# Patient Record
Sex: MALE | Race: WHITE | NOT HISPANIC OR LATINO | URBAN - METROPOLITAN AREA
[De-identification: names, ages, dates, MRNs, and addresses within clinical notes are randomized per-mention and may not be internally consistent; named-entity substitution may affect disease eponyms.]

---

## 2023-04-12 ENCOUNTER — OFFICE VISIT (OUTPATIENT)
Dept: PRIMARY CARE | Facility: CLINIC | Age: 53
End: 2023-04-12
Payer: COMMERCIAL

## 2023-04-12 VITALS
HEART RATE: 87 BPM | WEIGHT: 262 LBS | HEIGHT: 68 IN | OXYGEN SATURATION: 93 % | DIASTOLIC BLOOD PRESSURE: 95 MMHG | SYSTOLIC BLOOD PRESSURE: 149 MMHG | BODY MASS INDEX: 39.71 KG/M2 | TEMPERATURE: 97.6 F

## 2023-04-12 DIAGNOSIS — E78.2 HYPERLIPEMIA, MIXED: ICD-10-CM

## 2023-04-12 DIAGNOSIS — E66.01 MORBID OBESITY WITH BMI OF 40.0-44.9, ADULT (MULTI): ICD-10-CM

## 2023-04-12 DIAGNOSIS — I10 BENIGN ESSENTIAL HYPERTENSION: ICD-10-CM

## 2023-04-12 DIAGNOSIS — E11.9 CONTROLLED TYPE 2 DIABETES MELLITUS WITHOUT COMPLICATION, WITHOUT LONG-TERM CURRENT USE OF INSULIN (MULTI): Primary | ICD-10-CM

## 2023-04-12 DIAGNOSIS — M10.00 IDIOPATHIC GOUT, UNSPECIFIED CHRONICITY, UNSPECIFIED SITE: ICD-10-CM

## 2023-04-12 DIAGNOSIS — F41.1 GENERALIZED ANXIETY DISORDER: ICD-10-CM

## 2023-04-12 DIAGNOSIS — E11.3293 MILD NONPROLIFERATIVE DIABETIC RETINOPATHY OF BOTH EYES WITHOUT MACULAR EDEMA ASSOCIATED WITH TYPE 2 DIABETES MELLITUS (MULTI): ICD-10-CM

## 2023-04-12 PROBLEM — H52.10 MYOPIA WITH PRESBYOPIA: Status: ACTIVE | Noted: 2023-04-12

## 2023-04-12 PROBLEM — M77.11 LATERAL EPICONDYLITIS OF RIGHT ELBOW: Status: ACTIVE | Noted: 2023-04-12

## 2023-04-12 PROBLEM — B35.4 TINEA CORPORIS: Status: ACTIVE | Noted: 2023-04-12

## 2023-04-12 PROBLEM — Q55.4 CONGENITAL ABSENCE OF VAS DEFERENS: Status: ACTIVE | Noted: 2023-04-12

## 2023-04-12 PROBLEM — H52.4 MYOPIA WITH PRESBYOPIA: Status: ACTIVE | Noted: 2023-04-12

## 2023-04-12 PROBLEM — H25.813 COMBINED FORM OF AGE-RELATED CATARACT, BOTH EYES: Status: ACTIVE | Noted: 2023-04-12

## 2023-04-12 PROBLEM — H52.209 ASTIGMATISM: Status: ACTIVE | Noted: 2023-04-12

## 2023-04-12 PROBLEM — M25.521 ARTHRALGIA OF RIGHT ELBOW: Status: ACTIVE | Noted: 2023-04-12

## 2023-04-12 PROBLEM — R42 LIGHTHEADED: Status: ACTIVE | Noted: 2023-04-12

## 2023-04-12 PROBLEM — S16.1XXA NECK STRAIN, INITIAL ENCOUNTER: Status: ACTIVE | Noted: 2023-04-12

## 2023-04-12 LAB — POC HEMOGLOBIN A1C: 6.8 % (ref 4.2–6.5)

## 2023-04-12 PROCEDURE — 90750 HZV VACC RECOMBINANT IM: CPT | Performed by: STUDENT IN AN ORGANIZED HEALTH CARE EDUCATION/TRAINING PROGRAM

## 2023-04-12 PROCEDURE — 1036F TOBACCO NON-USER: CPT | Performed by: STUDENT IN AN ORGANIZED HEALTH CARE EDUCATION/TRAINING PROGRAM

## 2023-04-12 PROCEDURE — 3008F BODY MASS INDEX DOCD: CPT | Performed by: STUDENT IN AN ORGANIZED HEALTH CARE EDUCATION/TRAINING PROGRAM

## 2023-04-12 PROCEDURE — 99396 PREV VISIT EST AGE 40-64: CPT | Performed by: STUDENT IN AN ORGANIZED HEALTH CARE EDUCATION/TRAINING PROGRAM

## 2023-04-12 PROCEDURE — 90471 IMMUNIZATION ADMIN: CPT | Performed by: STUDENT IN AN ORGANIZED HEALTH CARE EDUCATION/TRAINING PROGRAM

## 2023-04-12 PROCEDURE — 3077F SYST BP >= 140 MM HG: CPT | Performed by: STUDENT IN AN ORGANIZED HEALTH CARE EDUCATION/TRAINING PROGRAM

## 2023-04-12 PROCEDURE — 83036 HEMOGLOBIN GLYCOSYLATED A1C: CPT | Performed by: STUDENT IN AN ORGANIZED HEALTH CARE EDUCATION/TRAINING PROGRAM

## 2023-04-12 PROCEDURE — 4010F ACE/ARB THERAPY RXD/TAKEN: CPT | Performed by: STUDENT IN AN ORGANIZED HEALTH CARE EDUCATION/TRAINING PROGRAM

## 2023-04-12 PROCEDURE — 93000 ELECTROCARDIOGRAM COMPLETE: CPT | Performed by: STUDENT IN AN ORGANIZED HEALTH CARE EDUCATION/TRAINING PROGRAM

## 2023-04-12 PROCEDURE — 3080F DIAST BP >= 90 MM HG: CPT | Performed by: STUDENT IN AN ORGANIZED HEALTH CARE EDUCATION/TRAINING PROGRAM

## 2023-04-12 RX ORDER — OMEGA-3-ACID ETHYL ESTERS 1 G/1
2 CAPSULE, LIQUID FILLED ORAL 2 TIMES DAILY
COMMUNITY
Start: 2012-02-17 | End: 2023-04-12 | Stop reason: WASHOUT

## 2023-04-12 RX ORDER — ALLOPURINOL 100 MG/1
200 TABLET ORAL DAILY
Qty: 180 TABLET | Refills: 3 | Status: SHIPPED | OUTPATIENT
Start: 2023-04-12 | End: 2023-06-09 | Stop reason: SDUPTHER

## 2023-04-12 RX ORDER — ZOLPIDEM TARTRATE 10 MG/1
10 TABLET ORAL DAILY PRN
COMMUNITY
Start: 2013-06-24 | End: 2023-04-12 | Stop reason: WASHOUT

## 2023-04-12 RX ORDER — ESCITALOPRAM OXALATE 20 MG/1
20 TABLET ORAL DAILY
Qty: 90 TABLET | Refills: 3 | Status: SHIPPED | OUTPATIENT
Start: 2023-04-12 | End: 2023-07-25 | Stop reason: SDUPTHER

## 2023-04-12 RX ORDER — FENOFIBRATE 145 MG/1
1 TABLET, FILM COATED ORAL DAILY
COMMUNITY
Start: 2021-01-11 | End: 2023-04-12 | Stop reason: SDUPTHER

## 2023-04-12 RX ORDER — EMPAGLIFLOZIN 25 MG/1
1 TABLET, FILM COATED ORAL DAILY
COMMUNITY
Start: 2021-06-03 | End: 2023-07-25 | Stop reason: SDUPTHER

## 2023-04-12 RX ORDER — METFORMIN HYDROCHLORIDE 500 MG/1
TABLET, EXTENDED RELEASE ORAL
COMMUNITY
Start: 2020-11-28 | End: 2023-07-25 | Stop reason: SDUPTHER

## 2023-04-12 RX ORDER — LOSARTAN POTASSIUM 100 MG/1
1 TABLET ORAL DAILY
COMMUNITY
Start: 2021-03-08 | End: 2023-04-12 | Stop reason: SDUPTHER

## 2023-04-12 RX ORDER — ATORVASTATIN CALCIUM 40 MG/1
40 TABLET, FILM COATED ORAL DAILY
COMMUNITY
Start: 2023-01-26 | End: 2023-04-12 | Stop reason: SDUPTHER

## 2023-04-12 RX ORDER — GLIMEPIRIDE 2 MG/1
1 TABLET ORAL 2 TIMES DAILY
COMMUNITY
Start: 2016-09-14 | End: 2023-04-12 | Stop reason: WASHOUT

## 2023-04-12 RX ORDER — ESCITALOPRAM OXALATE 20 MG/1
1 TABLET ORAL DAILY
COMMUNITY
Start: 2021-01-11 | End: 2023-04-12 | Stop reason: SDUPTHER

## 2023-04-12 RX ORDER — LOSARTAN POTASSIUM 100 MG/1
100 TABLET ORAL DAILY
Qty: 90 TABLET | Refills: 3 | Status: SHIPPED | OUTPATIENT
Start: 2023-04-12 | End: 2023-07-25 | Stop reason: SDUPTHER

## 2023-04-12 RX ORDER — TRAMADOL HYDROCHLORIDE 50 MG/1
50 TABLET ORAL EVERY 6 HOURS PRN
COMMUNITY
Start: 2012-02-17 | End: 2023-04-12 | Stop reason: WASHOUT

## 2023-04-12 RX ORDER — MULTIVITAMIN
1 TABLET ORAL
COMMUNITY
Start: 2011-08-17 | End: 2023-04-12 | Stop reason: WASHOUT

## 2023-04-12 RX ORDER — ASPIRIN 81 MG/1
81 TABLET ORAL
COMMUNITY
Start: 2011-08-17

## 2023-04-12 RX ORDER — SIMVASTATIN 20 MG/1
20 TABLET, FILM COATED ORAL
COMMUNITY
Start: 2014-12-22 | End: 2023-04-12 | Stop reason: WASHOUT

## 2023-04-12 RX ORDER — ALLOPURINOL 100 MG/1
2 TABLET ORAL DAILY
COMMUNITY
Start: 2020-11-01 | End: 2023-04-12 | Stop reason: SDUPTHER

## 2023-04-12 RX ORDER — SIMVASTATIN 10 MG/1
1 TABLET, FILM COATED ORAL NIGHTLY
COMMUNITY
Start: 2016-09-14 | End: 2023-04-12 | Stop reason: WASHOUT

## 2023-04-12 RX ORDER — ATORVASTATIN CALCIUM 40 MG/1
40 TABLET, FILM COATED ORAL DAILY
Qty: 90 TABLET | Refills: 3 | Status: SHIPPED | OUTPATIENT
Start: 2023-04-12 | End: 2023-07-25 | Stop reason: SDUPTHER

## 2023-04-12 RX ORDER — FENOFIBRATE 145 MG/1
145 TABLET, FILM COATED ORAL DAILY
Qty: 90 TABLET | Refills: 3 | Status: SHIPPED | OUTPATIENT
Start: 2023-04-12 | End: 2023-07-25 | Stop reason: SDUPTHER

## 2023-04-12 NOTE — PROGRESS NOTES
"Subjective   Patient ID: Jai Betancourt is a 53 y.o. male who presents for Establish Care and Diabetes.    See excellent notes from Dr. Watt, who is now retired. Transferring to my care today; grateful for the referral.     Life/social: 2 daughters. Semi retired, sold plastics business; now consults.  (NARDA Jesus at [facility]). Nonsmoker. Low risk EtOH. No IVDU, medical MJ for knee pain. Diet is low carb when possible. Exercise is hit or miss; mostly walking a few days a week.    Re: DM2 - longstanding dx. A1c of 6.7. Patient denies symptomatic highs or lows with regards to their glucose. Reports no polyuria, polydipsia, fatigue, vision changes, and stocking/glove neuropathy. Reports fair foot hygiene, denies knowledge of any foot ulcers or trauma.     Re: HTN - elevated in office today, he attributes this to rushing in. Declines BP med change today, however has cardiology apt upcoming. Has occasional bouts of lightheadedness when he gets up too quickly.    Re: anxiety - controlled on SSRI. No HI/SI. Due for refill.     Re: obesity - BMI > 40. Diabetic. Willing to talk to pharmacy team for GLP-1/GIP.     Re: gout- stable. No flare ups. Needs refill on allopurinol.     Re: HM - colon screen current. PSA due. VZV shot #2 given today.    PMHx, FHx, Social Hx, Surg Hx personally reviewed at this appointment. No pertinent findings and/or changes from prior (if applicable).    ROS: Denies wt gain/loss f/c HA LoC CP SOB NVDC. See HPI above, and scanned sheet (if applicable). All other systems are reviewed and are without complaint.                  Review of Systems    Objective   BP (!) 149/95   Pulse 87   Temp 36.4 °C (97.6 °F)   Ht 1.727 m (5' 8\")   Wt 119 kg (262 lb)   SpO2 93%   BMI 39.84 kg/m²     Physical Exam  Gen: morbidly obese, NAD. AAO x3.  HEENT: NC/AT. Anicteric sclera, symmetric pupils. MMM no thrush.  Neck: Soft, supple. No LAD. No goiter.  CV: RRR nl s1s2 no m/r/g  Pulm: CTAB no w/r/r, good " air exchange  GI: obese, soft NTND BS+ no hsm  Ext: WWP no edema  Neuro: II-XII grossly intact, nonfocal systemic findings  MSK: 5/5 strength b/l UE and LE  Gait: unremarkable   Feet: no ulcers. Intact sensation.     Lab Results   Component Value Date    WBC 6.1 10/19/2022    HGB 15.7 10/19/2022    HCT 47.0 10/19/2022     10/19/2022    CHOL 122 10/17/2022    TRIG 298 (H) 10/17/2022    HDL 31.7 (A) 10/17/2022     10/17/2022    K 4.5 10/17/2022     10/17/2022    CREATININE 0.95 10/17/2022    BUN 21 10/17/2022    CO2 30 10/17/2022    HGBA1C 6.8 (A) 04/12/2023     Encounter Date: 04/12/23   ECG 12 lead (Clinic Performed)    Narrative    EKG normal sinus rhythm; please see scanned in EKG for result         Assessment/Plan   Problem List Items Addressed This Visit       Benign essential hypertension    Relevant Medications    losartan (Cozaar) 100 mg tablet    Other Relevant Orders    ECG 12 lead (Clinic Performed)    CBC and Auto Differential    Comprehensive Metabolic Panel    Lipid Panel    Prostate Specific Antigen, Screen    Controlled type 2 diabetes mellitus without complication, without long-term current use of insulin (CMS/[facility]) - Primary    Relevant Orders    Follow Up In [facility] - Pharmacy    POCT glycosylated hemoglobin (Hb A1C) manually resulted (Completed)    ECG 12 lead (Clinic Performed)    CBC and Auto Differential    Comprehensive Metabolic Panel    Lipid Panel    Prostate Specific Antigen, Screen    Albumin , Urine Random    Urinalysis Microscopic Only    Generalized anxiety disorder    Relevant Medications    escitalopram (Lexapro) 20 mg tablet    Hyperlipemia, mixed    Relevant Medications    atorvastatin (Lipitor) 40 mg tablet    fenofibrate (Tricor) 145 mg tablet    Other Relevant Orders    Lipid Panel    Idiopathic gout    Relevant Medications    allopurinol (Zyloprim) 100 mg tablet    Morbid obesity with BMI of 40.0-44.9, adult (CMS/[facility])    Relevant Orders    Follow  Up In [facility] - Pharmacy

## 2023-04-12 NOTE — PATIENT INSTRUCTIONS
Please stop at the lab ([address]) to complete your blood and/or urine work that I've ordered for you.    I will contact you with the results at my soonest convenience. I strongly urge you to use [facility] as this is the quickest and easiest way to access your results and recieve my correspondences.     I have referred you to our PharmD team to help find the right diabetic and weight loss medication for you. If covered, Mounjaro is my preferred medication.    I recommend that you lose weight via lifestyle modifications such as diet and exercise.    Shingles shot #2 given today.    Follow up with the cardiologist. Your EKG looks OK today.

## 2023-04-18 ENCOUNTER — LAB (OUTPATIENT)
Dept: LAB | Facility: LAB | Age: 53
End: 2023-04-18
Payer: COMMERCIAL

## 2023-04-18 DIAGNOSIS — E11.9 CONTROLLED TYPE 2 DIABETES MELLITUS WITHOUT COMPLICATION, WITHOUT LONG-TERM CURRENT USE OF INSULIN (MULTI): ICD-10-CM

## 2023-04-18 DIAGNOSIS — I10 BENIGN ESSENTIAL HYPERTENSION: ICD-10-CM

## 2023-04-18 DIAGNOSIS — E78.2 HYPERLIPEMIA, MIXED: ICD-10-CM

## 2023-04-18 LAB
ALANINE AMINOTRANSFERASE (SGPT) (U/L) IN SER/PLAS: 50 U/L (ref 10–52)
ALBUMIN (G/DL) IN SER/PLAS: 4.8 G/DL (ref 3.4–5)
ALBUMIN (MG/L) IN URINE: 15.6 MG/L
ALBUMIN/CREATININE (UG/MG) IN URINE: 21.8 UG/MG CRT (ref 0–30)
ALKALINE PHOSPHATASE (U/L) IN SER/PLAS: 56 U/L (ref 33–120)
ANION GAP IN SER/PLAS: 14 MMOL/L (ref 10–20)
ASPARTATE AMINOTRANSFERASE (SGOT) (U/L) IN SER/PLAS: 39 U/L (ref 9–39)
BASOPHILS (10*3/UL) IN BLOOD BY AUTOMATED COUNT: 0.02 X10E9/L (ref 0–0.1)
BASOPHILS/100 LEUKOCYTES IN BLOOD BY AUTOMATED COUNT: 0.3 % (ref 0–2)
BILIRUBIN TOTAL (MG/DL) IN SER/PLAS: 0.5 MG/DL (ref 0–1.2)
CALCIUM (MG/DL) IN SER/PLAS: 10.2 MG/DL (ref 8.6–10.6)
CARBON DIOXIDE, TOTAL (MMOL/L) IN SER/PLAS: 29 MMOL/L (ref 21–32)
CHLORIDE (MMOL/L) IN SER/PLAS: 102 MMOL/L (ref 98–107)
CHOLESTEROL (MG/DL) IN SER/PLAS: 120 MG/DL (ref 0–199)
CHOLESTEROL IN HDL (MG/DL) IN SER/PLAS: 34 MG/DL
CHOLESTEROL/HDL RATIO: 3.5
CREATININE (MG/DL) IN SER/PLAS: 1.01 MG/DL (ref 0.5–1.3)
CREATININE (MG/DL) IN URINE: 71.7 MG/DL (ref 20–370)
EOSINOPHILS (10*3/UL) IN BLOOD BY AUTOMATED COUNT: 0.06 X10E9/L (ref 0–0.7)
EOSINOPHILS/100 LEUKOCYTES IN BLOOD BY AUTOMATED COUNT: 0.9 % (ref 0–6)
ERYTHROCYTE DISTRIBUTION WIDTH (RATIO) BY AUTOMATED COUNT: 12.3 % (ref 11.5–14.5)
ERYTHROCYTE MEAN CORPUSCULAR HEMOGLOBIN CONCENTRATION (G/DL) BY AUTOMATED: 32.8 G/DL (ref 32–36)
ERYTHROCYTE MEAN CORPUSCULAR VOLUME (FL) BY AUTOMATED COUNT: 86 FL (ref 80–100)
ERYTHROCYTES (10*6/UL) IN BLOOD BY AUTOMATED COUNT: 6.21 X10E12/L (ref 4.5–5.9)
GFR MALE: 89 ML/MIN/1.73M2
GLUCOSE (MG/DL) IN SER/PLAS: 131 MG/DL (ref 74–99)
HEMATOCRIT (%) IN BLOOD BY AUTOMATED COUNT: 53.1 % (ref 41–52)
HEMOGLOBIN (G/DL) IN BLOOD: 17.4 G/DL (ref 13.5–17.5)
IMMATURE GRANULOCYTES/100 LEUKOCYTES IN BLOOD BY AUTOMATED COUNT: 0.5 % (ref 0–0.9)
LDL: 28 MG/DL (ref 0–99)
LEUKOCYTES (10*3/UL) IN BLOOD BY AUTOMATED COUNT: 6.4 X10E9/L (ref 4.4–11.3)
LYMPHOCYTES (10*3/UL) IN BLOOD BY AUTOMATED COUNT: 2.53 X10E9/L (ref 1.2–4.8)
LYMPHOCYTES/100 LEUKOCYTES IN BLOOD BY AUTOMATED COUNT: 39.8 % (ref 13–44)
MONOCYTES (10*3/UL) IN BLOOD BY AUTOMATED COUNT: 0.4 X10E9/L (ref 0.1–1)
MONOCYTES/100 LEUKOCYTES IN BLOOD BY AUTOMATED COUNT: 6.3 % (ref 2–10)
NEUTROPHILS (10*3/UL) IN BLOOD BY AUTOMATED COUNT: 3.32 X10E9/L (ref 1.2–7.7)
NEUTROPHILS/100 LEUKOCYTES IN BLOOD BY AUTOMATED COUNT: 52.2 % (ref 40–80)
NON HDL CHOLESTEROL: 86 MG/DL
NRBC (PER 100 WBCS) BY AUTOMATED COUNT: 0 /100 WBC (ref 0–0)
PLATELETS (10*3/UL) IN BLOOD AUTOMATED COUNT: 230 X10E9/L (ref 150–450)
POTASSIUM (MMOL/L) IN SER/PLAS: 4.5 MMOL/L (ref 3.5–5.3)
PROSTATE SPECIFIC ANTIGEN,SCREEN: 0.97 NG/ML (ref 0–4)
PROTEIN TOTAL: 8 G/DL (ref 6.4–8.2)
RBC, URINE: NORMAL /HPF (ref 0–5)
SODIUM (MMOL/L) IN SER/PLAS: 140 MMOL/L (ref 136–145)
SQUAMOUS EPITHELIAL CELLS, URINE: 1 /HPF
TRIGLYCERIDE (MG/DL) IN SER/PLAS: 290 MG/DL (ref 0–149)
UREA NITROGEN (MG/DL) IN SER/PLAS: 22 MG/DL (ref 6–23)
VLDL: 58 MG/DL (ref 0–40)
WBC, URINE: <1 /HPF (ref 0–5)

## 2023-04-18 PROCEDURE — 81001 URINALYSIS AUTO W/SCOPE: CPT

## 2023-04-18 PROCEDURE — 82570 ASSAY OF URINE CREATININE: CPT

## 2023-04-18 PROCEDURE — 84153 ASSAY OF PSA TOTAL: CPT

## 2023-04-18 PROCEDURE — 80053 COMPREHEN METABOLIC PANEL: CPT

## 2023-04-18 PROCEDURE — 82043 UR ALBUMIN QUANTITATIVE: CPT

## 2023-04-18 PROCEDURE — 36415 COLL VENOUS BLD VENIPUNCTURE: CPT

## 2023-04-18 PROCEDURE — 85025 COMPLETE CBC W/AUTO DIFF WBC: CPT

## 2023-04-18 PROCEDURE — 80061 LIPID PANEL: CPT

## 2023-05-04 DIAGNOSIS — E11.9 CONTROLLED TYPE 2 DIABETES MELLITUS WITHOUT COMPLICATION, WITHOUT LONG-TERM CURRENT USE OF INSULIN (MULTI): Primary | ICD-10-CM

## 2023-05-04 DIAGNOSIS — E66.01 MORBID OBESITY WITH BMI OF 40.0-44.9, ADULT (MULTI): ICD-10-CM

## 2023-05-04 NOTE — PROGRESS NOTES
Pt wishes to start Mounjaro or injectable GLP-1 covered by insurance for weight loss and diabetic control.

## 2023-05-05 ENCOUNTER — TELEPHONE (OUTPATIENT)
Dept: PHARMACY | Facility: HOSPITAL | Age: 53
End: 2023-05-05
Payer: COMMERCIAL

## 2023-05-10 ENCOUNTER — TELEMEDICINE (OUTPATIENT)
Dept: PHARMACY | Facility: HOSPITAL | Age: 53
End: 2023-05-10
Payer: COMMERCIAL

## 2023-05-10 DIAGNOSIS — E66.01 MORBID OBESITY WITH BMI OF 40.0-44.9, ADULT (MULTI): ICD-10-CM

## 2023-05-10 DIAGNOSIS — E11.9 CONTROLLED TYPE 2 DIABETES MELLITUS WITHOUT COMPLICATION, WITHOUT LONG-TERM CURRENT USE OF INSULIN (MULTI): ICD-10-CM

## 2023-05-10 RX ORDER — TIRZEPATIDE 2.5 MG/.5ML
2.5 INJECTION, SOLUTION SUBCUTANEOUS
Qty: 2 ML | Refills: 0 | Status: SHIPPED | OUTPATIENT
Start: 2023-05-10 | End: 2023-06-07 | Stop reason: ALTCHOICE

## 2023-05-10 NOTE — PATIENT INSTRUCTIONS
Mounjaro Education:     - Counseled patient on MOA, expectations, side effects, duration of therapy, contraindications, administration, and monitoring parameters  - Answered all patient questions and concerns  - Counseled patient on Mounjaro MOA, expectations, side effects, duration of therapy, administration, and monitoring parameters.  - Provided detailed dosing and administration counseling to ensure proper technique.   - Reviewed Mounjaro titration schedule, starting with 2.5 mg once weekly to a goal of 15 mg once weekly if tolerated  - Counseled patient on the benefits of GLP-1ra glycemic control and weight loss  - Reviewed storage requirements of Mounjaro when not in use, and when to administer the medication if a dose is missed.  - Advised patient that they may experience improved satiety after meals and portion sizes of meals may be reduced as doses of Mounjaro increase.

## 2023-05-10 NOTE — PROGRESS NOTES
Subjective   Patient ID: Jai Betancourt is a 53 y.o. male who presents for Diabetes (Referred for GLP/GIP initiation--Mounjaro; Will send Rx to pharmacy).    HPI     Review of Systems    Objective   There were no vitals taken for this visit.    Physical Exam    Assessment/Plan   Problem List Items Addressed This Visit          Endocrine/Metabolic    Controlled type 2 diabetes mellitus without complication, without long-term current use of insulin (CMS/[facility])    Morbid obesity with BMI of 40.0-44.9, adult (CMS/[facility])     Start Mounjaro 2.5 mg subcutaneous weekly; up-titrate monthly  Rx sent to [facility] to be mailed to pt; follow-up in 4 weeks    Continue all meds under the continuation of care with the referring provider and clinical pharmacy team.”

## 2023-06-07 ENCOUNTER — TELEMEDICINE (OUTPATIENT)
Dept: PHARMACY | Facility: HOSPITAL | Age: 53
End: 2023-06-07
Payer: COMMERCIAL

## 2023-06-07 DIAGNOSIS — E66.01 MORBID OBESITY WITH BMI OF 40.0-44.9, ADULT (MULTI): ICD-10-CM

## 2023-06-07 DIAGNOSIS — E11.9 CONTROLLED TYPE 2 DIABETES MELLITUS WITHOUT COMPLICATION, WITHOUT LONG-TERM CURRENT USE OF INSULIN (MULTI): ICD-10-CM

## 2023-06-07 RX ORDER — TIRZEPATIDE 5 MG/.5ML
5 INJECTION, SOLUTION SUBCUTANEOUS
Qty: 2 ML | Refills: 0 | Status: SHIPPED | OUTPATIENT
Start: 2023-06-07 | End: 2023-07-07 | Stop reason: ALTCHOICE

## 2023-06-07 NOTE — PROGRESS NOTES
Subjective   Patient ID: Jai Betancourt is a 53 y.o. male who presents for No chief complaint on file..    Nausea side effect; but minor in nature and is fine with this for the time being. Discussed monitoring diet to identify potential food intolerances and eating nutrient rich food since he is now eating significantly less. Patient requesting to move to next titration dose.     BG: has significantly improved and have been b/t 120-140 the last few days.         Assessment/Plan   Problem List Items Addressed This Visit          Endocrine/Metabolic    Controlled type 2 diabetes mellitus without complication, without long-term current use of insulin (CMS/[facility])    Morbid obesity with BMI of 40.0-44.9, adult (CMS/[facility])       LAB RESULTS:  Lab Results   Component Value Date    HGBA1C 6.8 (A) 04/12/2023    HGBA1C 7.7 (A) 10/20/2021     Lab Results   Component Value Date    CREATININE 1.01 04/18/2023      Lab Results   Component Value Date    CHOL 120 04/18/2023    CHOL 122 10/17/2022    CHOL 178 10/20/2021     Lab Results   Component Value Date    HDL 34.0 (A) 04/18/2023    HDL 31.7 (A) 10/17/2022    HDL 35.0 (A) 10/20/2021       No results found for: LDLCALC  Lab Results   Component Value Date    TRIG 290 (H) 04/18/2023    TRIG 298 (H) 10/17/2022    TRIG 636 (H) 10/20/2021     Uptitrate Mounjaro to 5.0 mg weekly  Follow-up in 4 weeks    Continue all meds under the continuation of care with the referring provider and clinical pharmacy team.

## 2023-06-09 ENCOUNTER — TELEMEDICINE (OUTPATIENT)
Dept: PHARMACY | Facility: HOSPITAL | Age: 53
End: 2023-06-09
Payer: COMMERCIAL

## 2023-06-09 DIAGNOSIS — E11.9 CONTROLLED TYPE 2 DIABETES MELLITUS WITHOUT COMPLICATION, WITHOUT LONG-TERM CURRENT USE OF INSULIN (MULTI): Primary | ICD-10-CM

## 2023-06-09 RX ORDER — ALLOPURINOL 100 MG/1
100 TABLET ORAL 2 TIMES DAILY
COMMUNITY
Start: 2014-03-05 | End: 2023-06-09 | Stop reason: ALTCHOICE

## 2023-06-09 ASSESSMENT — ENCOUNTER SYMPTOMS: DIABETIC ASSOCIATED SYMPTOMS: 0

## 2023-06-09 NOTE — PROGRESS NOTES
Jai Betancourt is a 53 y.o. male was referred to Clinical Pharmacy Team to complete a comprehensive medication review (CMR) with a pharmacist as part of the [employer] diabetes program.  The patient was referred for their Diabetes.    Referring Provider: Memo Watt MD    Subjective   No Known Allergies    [facility] - [place], [place] - [address]  [address]  [address]  [facility] [address]  Phone: [phone] Fax: [phone]    [facility] - [facility] AT Portal to Registered [facility]  [facility]  [place] [address]  Phone: [phone] Fax: [phone]      Diabetes  He has type 2 diabetes mellitus. The initial diagnosis of diabetes was made 14 years ago. His disease course has been fluctuating. There are no hypoglycemic associated symptoms. There are no diabetic associated symptoms. There are no hypoglycemic complications. There are no diabetic complications. Risk factors for coronary artery disease include diabetes mellitus. Current diabetic treatment includes oral agent (monotherapy). He is compliant with treatment all of the time. His weight is decreasing steadily. He is following a generally healthy diet. When asked about meal planning, he reported none. He has not had a previous visit with a dietitian. He participates in exercise three times a week. His breakfast blood glucose range is generally 110-130 mg/dl. His lunch blood glucose range is generally 110-130 mg/dl. His dinner blood glucose range is generally 110-130 mg/dl. His overall blood glucose range is 110-130 mg/dl. An ACE inhibitor/angiotensin II receptor blocker is being taken. He does not see a podiatrist.Eye exam is current.       Objective     There were no vitals taken for this visit.     LAB  Lab Results   Component Value Date    BILITOT 0.5 04/18/2023    CALCIUM 10.2 04/18/2023    CO2 29 04/18/2023      04/18/2023    CREATININE 1.01 04/18/2023    GLUCOSE 131 (H) 04/18/2023    ALKPHOS 56 04/18/2023    K 4.5 04/18/2023    PROT 8.0 04/18/2023     04/18/2023    AST 39 04/18/2023    ALT 50 04/18/2023    BUN 22 04/18/2023    ANIONGAP 14 04/18/2023    ALBUMIN 4.8 04/18/2023    GFRMALE 89 04/18/2023     Lab Results   Component Value Date    TRIG 290 (H) 04/18/2023    CHOL 120 04/18/2023    HDL 34.0 (A) 04/18/2023     Lab Results   Component Value Date    HGBA1C 6.8 (A) 04/12/2023       Current Outpatient Medications on File Prior to Visit   Medication Sig Dispense Refill    aspirin 81 mg EC tablet Take 1 tablet (81 mg) by mouth once daily.      atorvastatin (Lipitor) 40 mg tablet Take 1 tablet (40 mg) by mouth once daily. 90 tablet 3    escitalopram (Lexapro) 20 mg tablet Take 1 tablet (20 mg) by mouth once daily. 90 tablet 3    fenofibrate (Tricor) 145 mg tablet Take 1 tablet (145 mg) by mouth once daily. 90 tablet 3    Jardiance 25 mg Take 1 tablet (25 mg) by mouth once daily.      losartan (Cozaar) 100 mg tablet Take 1 tablet (100 mg) by mouth once daily. 90 tablet 3    metFORMIN XR (Glucophage-XR) 500 mg 24 hr tablet Take by mouth. take 2 tablets by mouth with one meal and 1 tablet with a second meal (3 tablets per day)      tirzepatide (Mounjaro) 5 mg/0.5 mL pen injector Inject 5 mg under the skin 1 (one) time per week. 2 mL 0    [DISCONTINUED] allopurinol (Zyloprim) 100 mg tablet Take 2 tablets (200 mg) by mouth once daily. 180 tablet 3    [DISCONTINUED] allopurinol (Zyloprim) 100 mg tablet Take 1 tablet (100 mg) by mouth twice a day.      [DISCONTINUED] tirzepatide (Mounjaro) 2.5 mg/0.5 mL pen injector Inject 2.5 mg under the skin 1 (one) time per week. 2 mL 0     No current facility-administered medications on file prior to visit.        HISTORICAL PHARMACOTHERAPY  See above    SECONDARY PREVENTION  - Statin? yes  - ACE-I/ARB? yes    Assessment/Plan   Problem List Items Addressed This Visit           Endocrine/Metabolic    Controlled type 2 diabetes mellitus without complication, without long-term current use of insulin (CMS/[facility]) - Primary          Follow up: 1 year    Continue all meds under the continuation of care with the referring provider and clinical pharmacy team.    Terry Sanchez PharmD     Verbal consent to manage patient's drug therapy was obtained from [the patient and/or an individual authorized to act on behalf of a patient]. They were informed they may decline to participate or withdraw from participation in pharmacy services at any time.

## 2023-07-07 ENCOUNTER — TELEMEDICINE (OUTPATIENT)
Dept: PHARMACY | Facility: HOSPITAL | Age: 53
End: 2023-07-07
Payer: COMMERCIAL

## 2023-07-07 DIAGNOSIS — E11.9 CONTROLLED TYPE 2 DIABETES MELLITUS WITHOUT COMPLICATION, WITHOUT LONG-TERM CURRENT USE OF INSULIN (MULTI): ICD-10-CM

## 2023-07-07 DIAGNOSIS — E66.01 MORBID OBESITY WITH BMI OF 40.0-44.9, ADULT (MULTI): ICD-10-CM

## 2023-07-07 RX ORDER — TIRZEPATIDE 7.5 MG/.5ML
7.5 INJECTION, SOLUTION SUBCUTANEOUS
Qty: 2 ML | Refills: 0 | Status: SHIPPED | OUTPATIENT
Start: 2023-07-07 | End: 2023-08-04 | Stop reason: ALTCHOICE

## 2023-07-07 NOTE — PROGRESS NOTES
"Subjective   Patient ID: Jai Betancourt is a 53 y.o. male who presents for No chief complaint on file.. Tolerated 5 mg weekly well; minor nausea after initial dose but subsides. BG b/t 120-150 per pt. Pt reports feeling great and ready to move up to next titration dose. Due for A1c; to see PCP before the end of the month.       Assessment/Plan   Problem List Items Addressed This Visit       Controlled type 2 diabetes mellitus without complication, without long-term current use of insulin (CMS/[facility])    Morbid obesity with BMI of 40.0-44.9, adult (CMS/[facility])       LAB RESULTS:  Lab Results   Component Value Date    HGBA1C 6.8 (A) 04/12/2023    HGBA1C 7.7 (A) 10/20/2021     Lab Results   Component Value Date    CREATININE 1.01 04/18/2023      Lab Results   Component Value Date    CHOL 120 04/18/2023    CHOL 122 10/17/2022    CHOL 178 10/20/2021     Lab Results   Component Value Date    HDL 34.0 (A) 04/18/2023    HDL 31.7 (A) 10/17/2022    HDL 35.0 (A) 10/20/2021       No results found for: \"LDLCALC\"  Lab Results   Component Value Date    TRIG 290 (H) 04/18/2023    TRIG 298 (H) 10/17/2022    TRIG 636 (H) 10/20/2021     Start Mounjaro 7.5 mg weekly; Continue Jardiance 25 mg daily  Review A1c results at next follow-up on 8/4/23 at 3:15p    Continue all meds under the continuation of care with the referring provider and clinical pharmacy team.         "

## 2023-07-12 ENCOUNTER — OFFICE VISIT (OUTPATIENT)
Dept: PRIMARY CARE | Facility: CLINIC | Age: 53
End: 2023-07-12
Payer: COMMERCIAL

## 2023-07-12 VITALS
HEART RATE: 75 BPM | TEMPERATURE: 97.5 F | BODY MASS INDEX: 38.65 KG/M2 | OXYGEN SATURATION: 97 % | WEIGHT: 255 LBS | HEIGHT: 68 IN | DIASTOLIC BLOOD PRESSURE: 88 MMHG | SYSTOLIC BLOOD PRESSURE: 142 MMHG

## 2023-07-12 DIAGNOSIS — E66.01 CLASS 2 SEVERE OBESITY DUE TO EXCESS CALORIES WITH SERIOUS COMORBIDITY AND BODY MASS INDEX (BMI) OF 38.0 TO 38.9 IN ADULT (MULTI): ICD-10-CM

## 2023-07-12 DIAGNOSIS — M10.00 IDIOPATHIC GOUT, UNSPECIFIED CHRONICITY, UNSPECIFIED SITE: ICD-10-CM

## 2023-07-12 DIAGNOSIS — I10 BENIGN ESSENTIAL HYPERTENSION: ICD-10-CM

## 2023-07-12 DIAGNOSIS — E11.9 CONTROLLED TYPE 2 DIABETES MELLITUS WITHOUT COMPLICATION, WITHOUT LONG-TERM CURRENT USE OF INSULIN (MULTI): Primary | ICD-10-CM

## 2023-07-12 LAB — POC HEMOGLOBIN A1C: 6.4 % (ref 4.2–6.5)

## 2023-07-12 PROCEDURE — 1036F TOBACCO NON-USER: CPT | Performed by: STUDENT IN AN ORGANIZED HEALTH CARE EDUCATION/TRAINING PROGRAM

## 2023-07-12 PROCEDURE — 3008F BODY MASS INDEX DOCD: CPT | Performed by: STUDENT IN AN ORGANIZED HEALTH CARE EDUCATION/TRAINING PROGRAM

## 2023-07-12 PROCEDURE — 3079F DIAST BP 80-89 MM HG: CPT | Performed by: STUDENT IN AN ORGANIZED HEALTH CARE EDUCATION/TRAINING PROGRAM

## 2023-07-12 PROCEDURE — 99213 OFFICE O/P EST LOW 20 MIN: CPT | Performed by: STUDENT IN AN ORGANIZED HEALTH CARE EDUCATION/TRAINING PROGRAM

## 2023-07-12 PROCEDURE — 4010F ACE/ARB THERAPY RXD/TAKEN: CPT | Performed by: STUDENT IN AN ORGANIZED HEALTH CARE EDUCATION/TRAINING PROGRAM

## 2023-07-12 PROCEDURE — 3077F SYST BP >= 140 MM HG: CPT | Performed by: STUDENT IN AN ORGANIZED HEALTH CARE EDUCATION/TRAINING PROGRAM

## 2023-07-12 PROCEDURE — 83036 HEMOGLOBIN GLYCOSYLATED A1C: CPT | Performed by: STUDENT IN AN ORGANIZED HEALTH CARE EDUCATION/TRAINING PROGRAM

## 2023-07-12 ASSESSMENT — ENCOUNTER SYMPTOMS
DIZZINESS: 0
POLYDIPSIA: 0
SWEATS: 1
WEAKNESS: 0
FATIGUE: 0
BLURRED VISION: 0
CONFUSION: 0
VISUAL CHANGE: 0
BLACKOUTS: 0
POLYPHAGIA: 0
HUNGER: 1
WEIGHT LOSS: 0
SPEECH DIFFICULTY: 0
TREMORS: 1
SEIZURES: 0
NERVOUS/ANXIOUS: 0
HEADACHES: 1

## 2023-07-12 NOTE — PROGRESS NOTES
Subjective   Patient ID: Jai Betancourt is a 53 y.o. male who presents for No chief complaint on file..    Life/social: 2 daughters. Semi retired, sold plastics business; now consults.  (Ann Marie, NARDA at [employer] [facility] [employer]). Nonsmoker. Low risk EtOH. No IVDU, medical MJ for knee pain. Diet is low carb when possible. Exercise is hit or miss; mostly walking a few days a week.     Re: DM2 - longstanding dx. A1c of 6.4% . Down ~10 lbs since starting mounjaro which he is tolerating very well. Patient denies symptomatic highs or lows with regards to their glucose. Reports no polyuria, polydipsia, fatigue, vision changes, and stocking/glove neuropathy. Reports fair foot hygiene, denies knowledge of any foot ulcers or trauma.      Re: HTN - see cardiology notes. BP acceptable. Reports no sx high or low from HTN; denies blurry vision, HA, dizziness LoC CP SoB Aranda and leg swelling     Re: anxiety - controlled on SSRI. No HI/SI.        Re: gout- stable. No flare ups. Needs refill on allopurinol.      Re: HM - colon screen current. PSA current.      PMHx, FHx, Social Hx, Surg Hx personally reviewed at this appointment. No pertinent findings and/or changes from prior (if applicable).     ROS: Denies wt gain/loss f/c HA LoC CP SOB NVDC. See HPI above, and scanned sheet (if applicable). All other systems are reviewed and are without complaint.       Diabetes  He has type 2 diabetes mellitus. No MedicAlert identification noted. The initial diagnosis of diabetes was made 14 years ago. Hypoglycemia symptoms include headaches, hunger, mood changes, sweats and tremors. Pertinent negatives for hypoglycemia include no confusion, dizziness, nervousness/anxiousness, pallor, seizures, sleepiness or speech difficulty. Pertinent negatives for diabetes include no blurred vision, no chest pain, no fatigue, no foot paresthesias, no foot ulcerations, no polydipsia, no polyphagia, no polyuria, no visual change, no weakness and no weight loss. Pertinent  negatives for hypoglycemia complications include no blackouts, no hospitalization, no nocturnal hypoglycemia, no required assistance and no required glucagon injection. Symptoms are improving. Pertinent negatives for diabetic complications include no CVA, heart disease, impotence, nephropathy, peripheral neuropathy, PVD or retinopathy. Risk factors for coronary artery disease include hypertension and obesity. Current diabetic treatment includes diet and oral agent (triple therapy). He is compliant with treatment all of the time. His weight is decreasing steadily. He is following a low fat/cholesterol diet. Meal planning includes avoidance of concentrated sweets and carbohydrate counting. He has not had a previous visit with a dietitian. He participates in exercise three times a week. He monitors blood glucose at home 3-4 x per week. Blood glucose monitoring compliance is adequate. His home blood glucose trend is decreasing steadily. His breakfast blood glucose is taken between 7-8 am. His breakfast blood glucose range is generally 130-140 mg/dl. His lunch blood glucose is taken between 12-1 pm. His lunch blood glucose range is generally 130-140 mg/dl. His overall blood glucose range is 140-180 mg/dl. He does not see a podiatrist.Eye exam is current.        Review of Systems   Constitutional:  Negative for fatigue and weight loss.   Eyes:  Negative for blurred vision.   Cardiovascular:  Negative for chest pain.   Endocrine: Negative for polydipsia, polyphagia and polyuria.   Genitourinary:  Negative for impotence.   Skin:  Negative for pallor.   Neurological:  Positive for tremors and headaches. Negative for dizziness, seizures, speech difficulty and weakness.   Psychiatric/Behavioral:  Negative for confusion. The patient is not nervous/anxious.        Objective   BP (!) 150/91   Pulse 75   Temp 36.4 °C (97.5 °F)   Wt 116 kg (255 lb)   SpO2 97%   BMI 38.77 kg/m²     Physical Exam  Gen: obese, NAD. AAO  x3.  HEENT: NC/AT. Anicteric sclera, symmetric pupils. MMM no thrush.  Neck: Soft, supple. No LAD. No goiter.  CV: RRR nl s1s2 no m/r/g  Pulm: CTAB no w/r/r, good air exchange  GI: soft NTND BS+ no hsm  Ext: WWP no edema  Neuro: II-XII grossly intact, nonfocal systemic findings  MSK: 5/5 strength b/l UE and LE  Gait: unremarkable   Feet: no ulcers, nl monofilament testing       Assessment/Plan   # DM2: 6.4%! Improving on mounjaro, down a few pounds  - continue current meds  - Routine Diabetic labs  - counselled on wt loss, diet, exercise, and lifestyle modifications  - yearly foot exams, eye exams     # HTN: at/near goal  - continue current regimen  - routine lab work if not recent  - continue lifestyle modifications    # Depression and/or Anxiety  - continue SSRI      # Gout: stable  - continue current meds    # Health Maintenance  - routine blood work  - Colon Cancer Screening: UTD  - PSA: UTD  - Immunizations: UTD  - AAA screening:  not indicated

## 2023-07-25 DIAGNOSIS — E11.9 CONTROLLED TYPE 2 DIABETES MELLITUS WITHOUT COMPLICATION, WITHOUT LONG-TERM CURRENT USE OF INSULIN (MULTI): ICD-10-CM

## 2023-07-25 DIAGNOSIS — E78.2 HYPERLIPEMIA, MIXED: ICD-10-CM

## 2023-07-25 DIAGNOSIS — Z00.00 HEALTHCARE MAINTENANCE: ICD-10-CM

## 2023-07-25 DIAGNOSIS — I10 BENIGN ESSENTIAL HYPERTENSION: ICD-10-CM

## 2023-07-25 DIAGNOSIS — F41.1 GENERALIZED ANXIETY DISORDER: ICD-10-CM

## 2023-07-25 RX ORDER — METFORMIN HYDROCHLORIDE 500 MG/1
500 TABLET, EXTENDED RELEASE ORAL
Qty: 90 TABLET | Refills: 2 | Status: SHIPPED | OUTPATIENT
Start: 2023-07-25 | End: 2023-07-28 | Stop reason: SDUPTHER

## 2023-07-25 RX ORDER — LOSARTAN POTASSIUM 100 MG/1
100 TABLET ORAL DAILY
Qty: 90 TABLET | Refills: 3 | Status: SHIPPED | OUTPATIENT
Start: 2023-07-25 | End: 2023-07-25

## 2023-07-25 RX ORDER — FENOFIBRATE 145 MG/1
145 TABLET, FILM COATED ORAL DAILY
Qty: 90 TABLET | Refills: 3 | Status: SHIPPED | OUTPATIENT
Start: 2023-07-25 | End: 2023-07-25

## 2023-07-25 RX ORDER — ESCITALOPRAM OXALATE 20 MG/1
20 TABLET ORAL DAILY
Qty: 90 TABLET | Refills: 3 | Status: SHIPPED | OUTPATIENT
Start: 2023-07-25 | End: 2023-07-25

## 2023-07-25 RX ORDER — ATORVASTATIN CALCIUM 40 MG/1
40 TABLET, FILM COATED ORAL DAILY
Qty: 90 TABLET | Refills: 3 | Status: SHIPPED | OUTPATIENT
Start: 2023-07-25 | End: 2023-07-25

## 2023-07-25 RX ORDER — EMPAGLIFLOZIN 25 MG/1
25 TABLET, FILM COATED ORAL DAILY
Qty: 90 TABLET | Refills: 2 | Status: SHIPPED | OUTPATIENT
Start: 2023-07-25 | End: 2023-07-25

## 2023-07-28 DIAGNOSIS — E11.9 CONTROLLED TYPE 2 DIABETES MELLITUS WITHOUT COMPLICATION, WITHOUT LONG-TERM CURRENT USE OF INSULIN (MULTI): ICD-10-CM

## 2023-07-29 RX ORDER — METFORMIN HYDROCHLORIDE 500 MG/1
500 TABLET, EXTENDED RELEASE ORAL
Qty: 270 TABLET | Refills: 2 | Status: SHIPPED | OUTPATIENT
Start: 2023-07-29 | End: 2023-07-29

## 2023-08-04 ENCOUNTER — TELEMEDICINE (OUTPATIENT)
Dept: PHARMACY | Facility: HOSPITAL | Age: 53
End: 2023-08-04
Payer: COMMERCIAL

## 2023-08-04 DIAGNOSIS — E11.9 CONTROLLED TYPE 2 DIABETES MELLITUS WITHOUT COMPLICATION, WITHOUT LONG-TERM CURRENT USE OF INSULIN (MULTI): ICD-10-CM

## 2023-08-04 DIAGNOSIS — E66.01 MORBID OBESITY WITH BMI OF 40.0-44.9, ADULT (MULTI): ICD-10-CM

## 2023-08-04 RX ORDER — TIRZEPATIDE 10 MG/.5ML
10 INJECTION, SOLUTION SUBCUTANEOUS
Qty: 2 ML | Refills: 0 | Status: SHIPPED | OUTPATIENT
Start: 2023-08-04 | End: 2023-08-31 | Stop reason: SINTOL

## 2023-08-04 NOTE — PROGRESS NOTES
"Subjective   Patient ID: Jai Betancourt is a 53 y.o. male who presents for Diabetes.  First dose of 7.5 mg he was \"feeling rough\" but recovered upon eating meals like he typically does. Subsequent injections were much better tolerated with 3 days of tolerable nausea. On the latter weekly injections, felt his appetite increasing significantly and is concerned that his blood sugars and weight may stall/increase. Desires to move up to 10 mg weekly. No other diabetic related complaints and overall is tolerating regimen well and patient has been happy with progress thus far. Recent A1c improved and at goal.       Assessment/Plan   Problem List Items Addressed This Visit       Controlled type 2 diabetes mellitus without complication, without long-term current use of insulin (CMS/[facility])    Relevant Medications    tirzepatide (Mounjaro) 10 mg/0.5 mL pen injector    Other Relevant Orders    Follow Up In [facility] - Pharmacy    Morbid obesity with BMI of 40.0-44.9, adult (CMS/[facility])    Relevant Medications    tirzepatide (Mounjaro) 10 mg/0.5 mL pen injector    Other Relevant Orders    Follow Up In [facility] - Pharmacy       LAB RESULTS:  Lab Results   Component Value Date    HGBA1C 6.4 07/12/2023    HGBA1C 6.8 (A) 04/12/2023    HGBA1C 7.7 (A) 10/20/2021     Lab Results   Component Value Date    CREATININE 1.01 04/18/2023      Lab Results   Component Value Date    CHOL 120 04/18/2023    CHOL 122 10/17/2022    CHOL 178 10/20/2021     Lab Results   Component Value Date    HDL 34.0 (A) 04/18/2023    HDL 31.7 (A) 10/17/2022    HDL 35.0 (A) 10/20/2021       No results found for: \"LDLCALC\"  Lab Results   Component Value Date    TRIG 290 (H) 04/18/2023    TRIG 298 (H) 10/17/2022    TRIG 636 (H) 10/20/2021     Continue all meds under the continuation of care with the referring provider and clinical pharmacy team.    Increase Mounjaro to 10 mg weekly  2.   Maintain spread out meals and snacks throughout the day and " stay well hydrated, especially the days proceeding injection.   3.    Follow-up in 4 weeks

## 2023-08-23 ENCOUNTER — TELEPHONE (OUTPATIENT)
Dept: PHARMACY | Facility: HOSPITAL | Age: 53
End: 2023-08-23
Payer: COMMERCIAL

## 2023-08-23 NOTE — TELEPHONE ENCOUNTER
Assessment/Plan:    Problem List Items Addressed This Visit     None      Visit Diagnoses     Right flank pain    -  Primary    Relevant Medications    ciprofloxacin (CIPRO) 500 mg tablet    Acute right-sided low back pain without sciatica        Relevant Orders    POCT urine dip (Completed)    Urine culture           Diagnoses and all orders for this visit:    Right flank pain  -     ciprofloxacin (CIPRO) 500 mg tablet; Take 1 tablet (500 mg total) by mouth every 12 (twelve) hours for 7 days    Acute right-sided low back pain without sciatica  -     POCT urine dip  -     Urine culture; Future  -     Urine culture    Other orders  -     Cancel: CT abdomen pelvis wo contrast; Future  -     Cancel: CBC and differential; Future  -     Cancel: Comprehensive metabolic panel; Future        Urine dip positive for leukocytes  Will also send out urine culture  Will treat empirically with ciprofloxacin  Clinically patient is stable  Advised her to let us know if symptoms do not improve or worsen  Subjective:      Patient ID: Akhil Wilson is a 32 y o  female  Brooke Singh is a 32year old female who presents with right flank pain for the past week  She states that over the weekend she had fevers on and off  TMax was 100 5 on Sunday  She has been using ibuprofen, which has been helping with the fevers  Her last dose of ibuprofen was Sunday  She also was experiencing some nausea  She denies any vomiting, abdominal pain, or diarrhea  She denies any decreased urination, dysuria, hematuria, frequency, or urgency  She does have a history of a kidney infection in the past        The following portions of the patient's history were reviewed and updated as appropriate:   She has no past medical history on file  ,  does not have any pertinent problems on file  ,   has a past surgical history that includes Appendectomy  ,  family history includes No Known Problems in her mother  ,   reports that she has never smoked   She has never used Called and left vm for patient to call this writer back in regards to n/v with Mounjaro. Relayed over vm that this writer has opening tomorrow to address his medication issue and recommended holding upcoming dose and push fluids and electrolytes.       smokeless tobacco  She reports that she drinks alcohol  Her drug history is not on file  ,  is allergic to lac bovis and pollen extract     Current Outpatient Medications   Medication Sig Dispense Refill    Norethin-Eth Estrad-Fe Biphas (LO LOESTRIN FE PO) Take by mouth      propranolol (INDERAL) 10 mg tablet Take 10 mg by mouth daily PRN 90 tablet 0    sertraline (ZOLOFT) 100 mg tablet Take 1 tablet (100 mg total) by mouth daily 90 tablet 0    ciprofloxacin (CIPRO) 500 mg tablet Take 1 tablet (500 mg total) by mouth every 12 (twelve) hours for 7 days 14 tablet 0     No current facility-administered medications for this visit  Review of Systems   Constitutional: Positive for fever (resolved)  Negative for chills, diaphoresis and fatigue  HENT: Negative for congestion, ear pain, postnasal drip, rhinorrhea, sneezing, sore throat and trouble swallowing  Eyes: Negative for pain and visual disturbance  Respiratory: Negative for apnea, cough, shortness of breath and wheezing  Cardiovascular: Negative for chest pain and palpitations  Gastrointestinal: Positive for nausea  Negative for abdominal pain, constipation, diarrhea and vomiting  Genitourinary: Positive for flank pain (right sided)  Negative for dysuria, frequency, hematuria and urgency  Musculoskeletal: Negative for arthralgias, gait problem and myalgias  Neurological: Negative for dizziness, syncope, weakness, light-headedness, numbness and headaches  Psychiatric/Behavioral: Negative for suicidal ideas  The patient is not nervous/anxious  Objective:  Vitals:    10/15/19 1538   BP: 112/70   BP Location: Left arm   Patient Position: Sitting   Temp: 97 8 °F (36 6 °C)   Weight: 79 8 kg (176 lb)   Height: 5' 4" (1 626 m)     Body mass index is 30 21 kg/m²  Physical Exam   Constitutional: She is oriented to person, place, and time  She appears well-developed and well-nourished  HENT:   Head: Normocephalic and atraumatic  Right Ear: Tympanic membrane, external ear and ear canal normal    Left Ear: Tympanic membrane, external ear and ear canal normal    Nose: Nose normal    Mouth/Throat: Oropharynx is clear and moist and mucous membranes are normal  No oropharyngeal exudate, posterior oropharyngeal edema or posterior oropharyngeal erythema  Eyes: Pupils are equal, round, and reactive to light  EOM are normal    Neck: Normal range of motion  Neck supple  Cardiovascular: Normal rate, regular rhythm and normal heart sounds  Exam reveals no gallop and no friction rub  No murmur heard  Pulmonary/Chest: Effort normal and breath sounds normal  No respiratory distress  She has no wheezes  She has no rales  Abdominal: Soft  Bowel sounds are normal  There is tenderness (Mild tenderness in right flank with palpation of lower abdomen  )  There is CVA tenderness (right)  There is no rebound and no guarding  Musculoskeletal: Normal range of motion  Lymphadenopathy:     She has no cervical adenopathy  Neurological: She is alert and oriented to person, place, and time  Skin: Skin is warm and dry  Psychiatric: She has a normal mood and affect  Her behavior is normal  Judgment and thought content normal    Vitals reviewed

## 2023-08-24 ENCOUNTER — TELEPHONE (OUTPATIENT)
Dept: PHARMACY | Facility: HOSPITAL | Age: 53
End: 2023-08-24
Payer: COMMERCIAL

## 2023-08-24 ENCOUNTER — TELEMEDICINE (OUTPATIENT)
Dept: PHARMACY | Facility: HOSPITAL | Age: 53
End: 2023-08-24
Payer: COMMERCIAL

## 2023-08-24 DIAGNOSIS — E11.9 CONTROLLED TYPE 2 DIABETES MELLITUS WITHOUT COMPLICATION, WITHOUT LONG-TERM CURRENT USE OF INSULIN (MULTI): Primary | ICD-10-CM

## 2023-08-24 DIAGNOSIS — E11.9 CONTROLLED TYPE 2 DIABETES MELLITUS WITHOUT COMPLICATION, WITHOUT LONG-TERM CURRENT USE OF INSULIN (MULTI): ICD-10-CM

## 2023-08-24 NOTE — TELEPHONE ENCOUNTER
Pt left vm that he is available after 9 am to call back in regards to Mounjaro adverse effects. Left VM for patient to call back

## 2023-08-24 NOTE — PATIENT INSTRUCTIONS
-Discontinue Mounjaro 10 mg   -Continue consuming fluid and electrolytes.   -potential for BG to increase and appetite to return; Check BG BID for the next week until follow-up on 8/31  -Patient to call this writer's direct line before follow-up if sx do not improve or worsen

## 2023-08-24 NOTE — PROGRESS NOTES
"Subjective   Patient ID: Jai Betancourt is a 53 y.o. male who presents for Follow-up. Patient has been experiencing severe nausea and vomiting with the Mounjaro 10 mg up to 3 days after injection. Is due for his injection today and stated he will no longer be taking. Is not open to down-titration at this time and \"done\" with Mounjaro.       Assessment/Plan   Problem List Items Addressed This Visit       Controlled type 2 diabetes mellitus without complication, without long-term current use of insulin (CMS/[facility])       LAB RESULTS:  Lab Results   Component Value Date    HGBA1C 6.4 07/12/2023    HGBA1C 6.8 (A) 04/12/2023    HGBA1C 7.7 (A) 10/20/2021     Lab Results   Component Value Date    CREATININE 1.01 04/18/2023      Lab Results   Component Value Date    CHOL 120 04/18/2023    CHOL 122 10/17/2022    CHOL 178 10/20/2021     Lab Results   Component Value Date    HDL 34.0 (A) 04/18/2023    HDL 31.7 (A) 10/17/2022    HDL 35.0 (A) 10/20/2021       No results found for: \"LDLCALC\"  Lab Results   Component Value Date    TRIG 290 (H) 04/18/2023    TRIG 298 (H) 10/17/2022    TRIG 636 (H) 10/20/2021         Continue all meds under the continuation of care with the referring provider and clinical pharmacy team.  -Discontinue Mounjaro 10 mg   -Continue consuming fluid and electrolytes.   -potential for BG to increase and appetite to return; Check BG BID for the next week until follow-up on 8/31. Next A1c due 10/23; baseline Previous A1c was 6.8 near the start of his Mounjaro--may not have a significant impact on goal.   -Patient to call this writer's direct line before follow-up if sx do not improve or worsen       "

## 2023-08-24 NOTE — Clinical Note
Pt decided he will no longer be taking Mounjaro; I follow-up with him in 1 week to see if sx improve and BG is still controlled. Thanks for contacting our team to let us know about this matter!

## 2023-08-31 ENCOUNTER — TELEMEDICINE (OUTPATIENT)
Dept: PHARMACY | Facility: HOSPITAL | Age: 53
End: 2023-08-31
Payer: COMMERCIAL

## 2023-08-31 DIAGNOSIS — E11.9 CONTROLLED TYPE 2 DIABETES MELLITUS WITHOUT COMPLICATION, WITHOUT LONG-TERM CURRENT USE OF INSULIN (MULTI): ICD-10-CM

## 2023-08-31 DIAGNOSIS — E66.01 MORBID OBESITY WITH BMI OF 40.0-44.9, ADULT (MULTI): ICD-10-CM

## 2023-08-31 NOTE — PROGRESS NOTES
"Subjective   Patient ID: Jai Betancourt is a 53 y.o. male who presents for Diabetes.Reports he just started getting appetite back over the past day or two and overall is feeling better since discontinuing his Mounjaro. Plans to keep his A1c/BG and weight stable with current regimen along with diet and exercise. Yesterday's B mg/dL and has been stable in this range. Does not report any hypo/hyperglycemia.     There were no vitals taken for this visit.        Assessment/Plan   Problem List Items Addressed This Visit       Controlled type 2 diabetes mellitus without complication, without long-term current use of insulin (CMS/[facility])    Morbid obesity with BMI of 40.0-44.9, adult (CMS/[facility])       LAB RESULTS:  Lab Results   Component Value Date    HGBA1C 6.4 2023    HGBA1C 6.8 (A) 2023    HGBA1C 7.7 (A) 10/20/2021     Lab Results   Component Value Date    CREATININE 1.01 2023      Lab Results   Component Value Date    CHOL 120 2023    CHOL 122 10/17/2022    CHOL 178 10/20/2021     Lab Results   Component Value Date    HDL 34.0 (A) 2023    HDL 31.7 (A) 10/17/2022    HDL 35.0 (A) 10/20/2021       No results found for: \"LDLCALC\"  Lab Results   Component Value Date    TRIG 290 (H) 2023    TRIG 298 (H) 10/17/2022    TRIG 636 (H) 10/20/2021     No components found for: \"CHOLHDL\"          No results found for: \"SCCMOPGW49\"    Continue all meds under the continuation of care with the referring provider and clinical pharmacy team.  Follow-up in 4 weeks; assess blood sugars/weight/sx       "

## 2023-09-28 ENCOUNTER — APPOINTMENT (OUTPATIENT)
Dept: PHARMACY | Facility: HOSPITAL | Age: 53
End: 2023-09-28
Payer: COMMERCIAL

## 2023-09-28 ENCOUNTER — TELEMEDICINE (OUTPATIENT)
Dept: PHARMACY | Facility: HOSPITAL | Age: 53
End: 2023-09-28
Payer: COMMERCIAL

## 2023-09-28 DIAGNOSIS — E11.9 CONTROLLED TYPE 2 DIABETES MELLITUS WITHOUT COMPLICATION, WITHOUT LONG-TERM CURRENT USE OF INSULIN (MULTI): ICD-10-CM

## 2023-09-28 DIAGNOSIS — E66.01 MORBID OBESITY WITH BMI OF 40.0-44.9, ADULT (MULTI): ICD-10-CM

## 2023-09-28 NOTE — Clinical Note
Pt doing well since discontinuing Mounjaro; had an 8lbs weight rebound and bg are b/t 130-150 since discontinuation. Plans to discuss regimen with you at follow-up at the end of the month. If additional therapy needed for DMII happy to help titrate/monitor.

## 2023-09-28 NOTE — PROGRESS NOTES
"Subjective   Patient ID: Jai Betancourt is a 53 y.o. male who presents for Follow-up (DMII). Doing much better since discontinueation of Mounjaro. States he has gained about 8 lbs back since stopping, but feels great overall. Says bg stays stable between 130-150 mg/dL. To see PCP on 10/30/23 for follow-up and will discuss his DMII regimen.       Assessment/Plan   Problem List Items Addressed This Visit       Controlled type 2 diabetes mellitus without complication, without long-term current use of insulin (CMS/[facility])    Morbid obesity with BMI of 40.0-44.9, adult (CMS/[facility])       LAB RESULTS:  Lab Results   Component Value Date    HGBA1C 6.4 07/12/2023    HGBA1C 6.8 (A) 04/12/2023    HGBA1C 7.7 (A) 10/20/2021     Lab Results   Component Value Date    CREATININE 1.01 04/18/2023      Lab Results   Component Value Date    CHOL 120 04/18/2023    CHOL 122 10/17/2022    CHOL 178 10/20/2021     Lab Results   Component Value Date    HDL 34.0 (A) 04/18/2023    HDL 31.7 (A) 10/17/2022    HDL 35.0 (A) 10/20/2021       No results found for: \"LDLCALC\"  Lab Results   Component Value Date    TRIG 290 (H) 04/18/2023    TRIG 298 (H) 10/17/2022    TRIG 636 (H) 10/20/2021     No components found for: \"CHOLHDL\"          No results found for: \"YIHAQHGV80\"    Continue all meds under the continuation of care with the referring provider and clinical pharmacy team.    Continue Metformin and Jardiance.   Follow-up as needed.      "

## 2023-10-11 ENCOUNTER — PHARMACY VISIT (OUTPATIENT)
Dept: PHARMACY | Facility: CLINIC | Age: 53
End: 2023-10-11
Payer: COMMERCIAL

## 2023-10-11 PROCEDURE — RXMED WILLOW AMBULATORY MEDICATION CHARGE

## 2023-10-18 ASSESSMENT — ENCOUNTER SYMPTOMS
HEADACHES: 0
HUNGER: 0
POLYDIPSIA: 0
WEAKNESS: 0
SPEECH DIFFICULTY: 0
BLACKOUTS: 0
BLURRED VISION: 0
WEIGHT LOSS: 0
FATIGUE: 0
SEIZURES: 0
CONFUSION: 0
DIZZINESS: 0
VISUAL CHANGE: 0
TREMORS: 0
SWEATS: 0
POLYPHAGIA: 0
NERVOUS/ANXIOUS: 0

## 2023-10-25 ENCOUNTER — OFFICE VISIT (OUTPATIENT)
Dept: PRIMARY CARE | Facility: CLINIC | Age: 53
End: 2023-10-25
Payer: COMMERCIAL

## 2023-10-25 DIAGNOSIS — E11.3293 MILD NONPROLIFERATIVE DIABETIC RETINOPATHY OF BOTH EYES WITHOUT MACULAR EDEMA ASSOCIATED WITH TYPE 2 DIABETES MELLITUS (MULTI): ICD-10-CM

## 2023-10-25 DIAGNOSIS — E11.9 CONTROLLED TYPE 2 DIABETES MELLITUS WITHOUT COMPLICATION, WITHOUT LONG-TERM CURRENT USE OF INSULIN (MULTI): Primary | ICD-10-CM

## 2023-10-25 LAB — POC HEMOGLOBIN A1C: 7.2 % (ref 4.2–6.5)

## 2023-10-25 PROCEDURE — 90471 IMMUNIZATION ADMIN: CPT | Performed by: STUDENT IN AN ORGANIZED HEALTH CARE EDUCATION/TRAINING PROGRAM

## 2023-10-25 PROCEDURE — 90472 IMMUNIZATION ADMIN EACH ADD: CPT | Performed by: STUDENT IN AN ORGANIZED HEALTH CARE EDUCATION/TRAINING PROGRAM

## 2023-10-25 PROCEDURE — 99213 OFFICE O/P EST LOW 20 MIN: CPT | Performed by: STUDENT IN AN ORGANIZED HEALTH CARE EDUCATION/TRAINING PROGRAM

## 2023-10-25 PROCEDURE — 4010F ACE/ARB THERAPY RXD/TAKEN: CPT | Performed by: STUDENT IN AN ORGANIZED HEALTH CARE EDUCATION/TRAINING PROGRAM

## 2023-10-25 PROCEDURE — 83036 HEMOGLOBIN GLYCOSYLATED A1C: CPT | Performed by: STUDENT IN AN ORGANIZED HEALTH CARE EDUCATION/TRAINING PROGRAM

## 2023-10-25 PROCEDURE — 3008F BODY MASS INDEX DOCD: CPT | Performed by: STUDENT IN AN ORGANIZED HEALTH CARE EDUCATION/TRAINING PROGRAM

## 2023-10-25 PROCEDURE — 90682 RIV4 VACC RECOMBINANT DNA IM: CPT | Performed by: STUDENT IN AN ORGANIZED HEALTH CARE EDUCATION/TRAINING PROGRAM

## 2023-10-25 PROCEDURE — 1036F TOBACCO NON-USER: CPT | Performed by: STUDENT IN AN ORGANIZED HEALTH CARE EDUCATION/TRAINING PROGRAM

## 2023-10-25 PROCEDURE — 90677 PCV20 VACCINE IM: CPT | Performed by: STUDENT IN AN ORGANIZED HEALTH CARE EDUCATION/TRAINING PROGRAM

## 2023-10-25 ASSESSMENT — ENCOUNTER SYMPTOMS
FATIGUE: 0
SEIZURES: 0
CONFUSION: 0
BLACKOUTS: 0
POLYDIPSIA: 0
WEIGHT LOSS: 0
POLYPHAGIA: 0
SPEECH DIFFICULTY: 0
DIZZINESS: 0
WEAKNESS: 0
BLURRED VISION: 0
HUNGER: 0
VISUAL CHANGE: 0
NERVOUS/ANXIOUS: 0
TREMORS: 0
HEADACHES: 0
SWEATS: 0

## 2023-10-25 NOTE — PATIENT INSTRUCTIONS
Your blood work is up to date; no need for additional draw at this appointment     I am referring you to our Pharmacist to help with your sugar control; the goal is to find safe, affordable medications and supplies to control your diabetes.     You received your flu shot today!     You received your pneumonia (PCV 20) shot today!

## 2023-10-25 NOTE — PROGRESS NOTES
Subjective   Patient ID: Jai Betancourt is a 53 y.o. male who presents for No chief complaint on file..    Re: DM2 - longstanding dx. A1c today of 7.2%. Was not able to tolerate the Mounjaro (nausea), has put on a few pounds since then. He wants to try doing the Mounjaro again but at lower dosing. Patient denies symptomatic highs or lows with regards to their glucose. Reports no polyuria, polydipsia, fatigue, vision changes, and stocking/glove neuropathy. Reports fair foot hygiene, denies knowledge of any foot ulcers or trauma.      Re: HTN - see cardiology notes. BP acceptable. Reports no sx high or low from HTN; denies blurry vision, HA, dizziness LoC CP SoB Aranda and leg swelling      Re: anxiety - controlled on SSRI. No HI/SI.      Re: gout- stable. No flare ups. Needs refill on allopurinol.      Re: HM - colon screen current. PSA current.      PMHx, FHx, Social Hx, Surg Hx personally reviewed at this appointment. No pertinent findings and/or changes from prior (if applicable).     ROS: Denies wt gain/loss f/c HA LoC CP SOB NVDC. See HPI above, and scanned sheet (if applicable). All other systems are reviewed and are without complaint.       Diabetes  He has type 2 diabetes mellitus. No MedicAlert identification noted. The initial diagnosis of diabetes was made 12 years ago. Pertinent negatives for hypoglycemia include no confusion, dizziness, headaches, hunger, mood changes, nervousness/anxiousness, pallor, seizures, sleepiness, speech difficulty, sweats or tremors. Pertinent negatives for diabetes include no blurred vision, no chest pain, no fatigue, no foot paresthesias, no foot ulcerations, no polydipsia, no polyphagia, no polyuria, no visual change, no weakness and no weight loss. Pertinent negatives for hypoglycemia complications include no blackouts, no hospitalization, no nocturnal hypoglycemia, no required assistance and no required glucagon injection. Symptoms are stable. Pertinent negatives for  diabetic complications include no CVA, heart disease, impotence, nephropathy, peripheral neuropathy, PVD or retinopathy. Risk factors for coronary artery disease include hypertension and obesity. Current diabetic treatment includes oral agent (dual therapy). He is compliant with treatment all of the time. His weight is stable. He is following a diabetic diet. Meal planning includes avoidance of concentrated sweets. He has not had a previous visit with a dietitian. He participates in exercise every other day. He monitors blood glucose at home 1-2 x per week. Blood glucose monitoring compliance is adequate. His home blood glucose trend is increasing steadily. His breakfast blood glucose is taken between 8-9 am. His breakfast blood glucose range is generally 140-180 mg/dl. His overall blood glucose range is 140-180 mg/dl. He does not see a podiatrist.Eye exam is current.        Review of Systems   Constitutional:  Negative for fatigue and weight loss.   Eyes:  Negative for blurred vision.   Cardiovascular:  Negative for chest pain.   Endocrine: Negative for polydipsia, polyphagia and polyuria.   Genitourinary:  Negative for impotence.   Skin:  Negative for pallor.   Neurological:  Negative for dizziness, tremors, seizures, speech difficulty, weakness and headaches.   Psychiatric/Behavioral:  Negative for confusion. The patient is not nervous/anxious.        Objective   There were no vitals taken for this visit.    Physical Exam  Gen: NAD. AAO x3.  HEENT: NC/AT. Anicteric sclera, symmetric pupils. MMM no thrush.  Neck: Soft, supple. No LAD. No goiter.  CV: RRR nl s1s2 no m/r/g  Pulm: CTAB no w/r/r, good air exchange  GI: soft NTND BS+ no hsm  Ext: WWP no edema  Neuro: II-XII grossly intact, nonfocal systemic findings  MSK: 5/5 strength b/l UE and LE  Gait: unremarkable   Feet: no ulcers, nl monofilament testing    Lab Results   Component Value Date    WBC 6.4 04/18/2023    HGB 17.4 04/18/2023    HCT 53.1 (H) 04/18/2023      04/18/2023    CHOL 120 04/18/2023    TRIG 290 (H) 04/18/2023    HDL 34.0 (A) 04/18/2023    ALT 50 04/18/2023    AST 39 04/18/2023     04/18/2023    K 4.5 04/18/2023     04/18/2023    CREATININE 1.01 04/18/2023    BUN 22 04/18/2023    CO2 29 04/18/2023    HGBA1C 6.4 07/12/2023     par       Assessment/Plan   # DM2: 7.2%, near goal  - continue current meds, touch base with [facility] about Mounjaro again  - Routine Diabetic labs  - counselled on wt loss, diet, exercise, and lifestyle modifications  - yearly foot exams, eye exams      # HTN: at/near goal  - continue current regimen  - routine lab work if not recent  - continue lifestyle modifications     # Depression and/or Anxiety  - continue SSRI       # Gout: stable  - continue current meds     # Health Maintenance  - routine blood work  - Colon Cancer Screening: UTD  - PSA: UTD  - Immunizations: flublok and PCV 20 (DM2 indication) today  - AAA screening:  not indicated

## 2023-10-27 ENCOUNTER — PHARMACY VISIT (OUTPATIENT)
Dept: PHARMACY | Facility: CLINIC | Age: 53
End: 2023-10-27
Payer: COMMERCIAL

## 2023-10-27 ENCOUNTER — TELEMEDICINE (OUTPATIENT)
Dept: PHARMACY | Facility: HOSPITAL | Age: 53
End: 2023-10-27
Payer: COMMERCIAL

## 2023-10-27 ENCOUNTER — APPOINTMENT (OUTPATIENT)
Dept: PHARMACY | Facility: HOSPITAL | Age: 53
End: 2023-10-27
Payer: COMMERCIAL

## 2023-10-27 DIAGNOSIS — E11.9 TYPE 2 DIABETES MELLITUS NOT AT GOAL (MULTI): Primary | ICD-10-CM

## 2023-10-27 DIAGNOSIS — E11.9 TYPE 2 DIABETES MELLITUS NOT AT GOAL (MULTI): ICD-10-CM

## 2023-10-27 PROCEDURE — RXMED WILLOW AMBULATORY MEDICATION CHARGE

## 2023-10-27 RX ORDER — TIRZEPATIDE 2.5 MG/.5ML
2.5 INJECTION, SOLUTION SUBCUTANEOUS
Qty: 2 ML | Refills: 2 | Status: SHIPPED | OUTPATIENT
Start: 2023-10-27 | End: 2023-12-19 | Stop reason: ALTCHOICE

## 2023-10-27 NOTE — PROGRESS NOTES
"Subjective   Patient ID: Jai Betancourt is a 53 y.o. male who presents for Follow-up. Pt and pcp would like to re-inititate Mounjaro at 2.5 mg weekly. Recent A1c increased by almost 1% since discontinuing.  At this time dose not want to go above 5 mg to avoid n/v like previous attempt.     Assessment/Plan   Problem List Items Addressed This Visit    None  Visit Diagnoses       Type 2 diabetes mellitus not at goal (CMS/[facility])                LAB RESULTS:  Lab Results   Component Value Date    HGBA1C 7.2 (A) 10/25/2023    HGBA1C 6.4 07/12/2023    HGBA1C 6.8 (A) 04/12/2023     Lab Results   Component Value Date    CREATININE 1.01 04/18/2023      Lab Results   Component Value Date    CHOL 120 04/18/2023    CHOL 122 10/17/2022    CHOL 178 10/20/2021     Lab Results   Component Value Date    HDL 34.0 (A) 04/18/2023    HDL 31.7 (A) 10/17/2022    HDL 35.0 (A) 10/20/2021       No results found for: \"LDLCALC\"  Lab Results   Component Value Date    TRIG 290 (H) 04/18/2023    TRIG 298 (H) 10/17/2022    TRIG 636 (H) 10/20/2021       Continue all meds under the continuation of care with the referring provider and clinical pharmacy team.    Start Mounjaro 2.5 mg weekly  Follow-up in 4 weeks to review BG and progress    "

## 2023-10-30 ENCOUNTER — APPOINTMENT (OUTPATIENT)
Dept: PRIMARY CARE | Facility: CLINIC | Age: 53
End: 2023-10-30
Payer: COMMERCIAL

## 2023-11-03 ENCOUNTER — PHARMACY VISIT (OUTPATIENT)
Dept: PHARMACY | Facility: CLINIC | Age: 53
End: 2023-11-03
Payer: COMMERCIAL

## 2023-11-03 DIAGNOSIS — D12.6 BENIGN NEOPLASM OF COLON, UNSPECIFIED PART OF COLON: Primary | ICD-10-CM

## 2023-11-03 PROCEDURE — RXMED WILLOW AMBULATORY MEDICATION CHARGE

## 2023-11-03 RX ORDER — POLYETHYLENE GLYCOL 3350, SODIUM SULFATE ANHYDROUS, SODIUM BICARBONATE, SODIUM CHLORIDE, POTASSIUM CHLORIDE 236; 22.74; 6.74; 5.86; 2.97 G/4L; G/4L; G/4L; G/4L; G/4L
4000 POWDER, FOR SOLUTION ORAL ONCE
Qty: 4000 ML | Refills: 0 | Status: SHIPPED | OUTPATIENT
Start: 2023-11-03 | End: 2023-11-04

## 2023-11-20 ENCOUNTER — TELEMEDICINE (OUTPATIENT)
Dept: PHARMACY | Facility: HOSPITAL | Age: 53
End: 2023-11-20
Payer: COMMERCIAL

## 2023-11-20 DIAGNOSIS — E11.9 TYPE 2 DIABETES MELLITUS NOT AT GOAL (MULTI): ICD-10-CM

## 2023-11-20 ASSESSMENT — ENCOUNTER SYMPTOMS: DIABETIC ASSOCIATED SYMPTOMS: 0

## 2023-11-20 NOTE — PROGRESS NOTES
"Subjective   Patient ID: Jai Betancourt is a 53 y.o. male who presents for No chief complaint on file..Tolerating well overall. Experiancing mild nausea for about 48 hours after injection and appetite has decreases. Blood sugars and weight trending down. Average BG between 120-150 and weight has dropped by about 3 lbs since starting 2.5 mg of Mounjaro. No changes to diet except portion sizes and no changes to exercise.     Diabetes  He presents for his follow-up diabetic visit. He has type 2 diabetes mellitus. His disease course has been improving. There are no hypoglycemic associated symptoms. There are no diabetic associated symptoms. There are no hypoglycemic complications. Symptoms are improving. His weight is increasing steadily. His overall blood glucose range is 140-180 mg/dl.       Assessment/Plan   Problem List Items Addressed This Visit    None  Visit Diagnoses       Type 2 diabetes mellitus not at goal (CMS/[facility])                LAB RESULTS:  Lab Results   Component Value Date    HGBA1C 7.2 (A) 10/25/2023    HGBA1C 6.4 07/12/2023    HGBA1C 6.8 (A) 04/12/2023     Lab Results   Component Value Date    CREATININE 1.01 04/18/2023      Lab Results   Component Value Date    CHOL 120 04/18/2023    CHOL 122 10/17/2022    CHOL 178 10/20/2021     Lab Results   Component Value Date    HDL 34.0 (A) 04/18/2023    HDL 31.7 (A) 10/17/2022    HDL 35.0 (A) 10/20/2021       No results found for: \"LDLCALC\"  Lab Results   Component Value Date    TRIG 290 (H) 04/18/2023    TRIG 298 (H) 10/17/2022    TRIG 636 (H) 10/20/2021         Continue all meds under the continuation of care with the referring provider and clinical pharmacy team.    Continue Mounjaro 2.5 mg weekly; refills available to be mailed to patient from [facility] - Patient to call to request refill.   2.   Follow-up in 1 month.     "

## 2023-11-21 ENCOUNTER — PHARMACY VISIT (OUTPATIENT)
Dept: PHARMACY | Facility: CLINIC | Age: 53
End: 2023-11-21
Payer: COMMERCIAL

## 2023-11-21 PROCEDURE — RXMED WILLOW AMBULATORY MEDICATION CHARGE

## 2023-12-18 ENCOUNTER — APPOINTMENT (OUTPATIENT)
Dept: PHARMACY | Facility: HOSPITAL | Age: 53
End: 2023-12-18
Payer: COMMERCIAL

## 2023-12-19 ENCOUNTER — TELEMEDICINE (OUTPATIENT)
Dept: PHARMACY | Facility: HOSPITAL | Age: 53
End: 2023-12-19
Payer: COMMERCIAL

## 2023-12-19 DIAGNOSIS — E11.9 TYPE 2 DIABETES MELLITUS NOT AT GOAL (MULTI): ICD-10-CM

## 2023-12-19 PROCEDURE — RXMED WILLOW AMBULATORY MEDICATION CHARGE

## 2023-12-19 RX ORDER — TIRZEPATIDE 5 MG/.5ML
5 INJECTION, SOLUTION SUBCUTANEOUS
Qty: 2 ML | Refills: 1 | Status: SHIPPED | OUTPATIENT
Start: 2023-12-19 | End: 2024-01-25 | Stop reason: WASHOUT

## 2023-12-19 NOTE — PROGRESS NOTES
"Subjective   Patient ID: Jai Betancourt is a 53 y.o. male who presents for No chief complaint on file.. Lost 30; put back on 12 since being off Mounjaro. Tolerating 2.5 mg weekly very well, but feels he could benefit from the next highest dose. No adverse events currently.  Has been keeping sugar \"in line\" with a more rigorous exericse and diet, but still has good days and bad days and a sugar range of 120-180. Weight currently neutral.       Assessment/Plan   Problem List Items Addressed This Visit    None      LAB RESULTS:  Lab Results   Component Value Date    HGBA1C 7.2 (A) 10/25/2023    HGBA1C 6.4 07/12/2023    HGBA1C 6.8 (A) 04/12/2023     Lab Results   Component Value Date    CREATININE 1.01 04/18/2023      Lab Results   Component Value Date    CHOL 120 04/18/2023    CHOL 122 10/17/2022    CHOL 178 10/20/2021     Lab Results   Component Value Date    HDL 34.0 (A) 04/18/2023    HDL 31.7 (A) 10/17/2022    HDL 35.0 (A) 10/20/2021       No results found for: \"LDLCALC\"  Lab Results   Component Value Date    TRIG 290 (H) 04/18/2023    TRIG 298 (H) 10/17/2022    TRIG 636 (H) 10/20/2021     No components found for: \"CHOLHDL\"          No results found for: \"[id number]\"    Continue all meds under the continuation of care with the referring provider and clinical pharmacy team.     Up-titrate Mounjaro to 5 mg weekly. Rx sent to [facility] to be mailed and will follow-up with patient in 1 month    "

## 2023-12-26 ENCOUNTER — PHARMACY VISIT (OUTPATIENT)
Dept: PHARMACY | Facility: CLINIC | Age: 53
End: 2023-12-26
Payer: COMMERCIAL

## 2024-01-03 PROCEDURE — RXMED WILLOW AMBULATORY MEDICATION CHARGE

## 2024-01-06 ENCOUNTER — PHARMACY VISIT (OUTPATIENT)
Dept: PHARMACY | Facility: CLINIC | Age: 54
End: 2024-01-06
Payer: COMMERCIAL

## 2024-01-12 ENCOUNTER — APPOINTMENT (OUTPATIENT)
Dept: GASTROENTEROLOGY | Facility: EXTERNAL LOCATION | Age: 54
End: 2024-01-12
Payer: COMMERCIAL

## 2024-01-16 ENCOUNTER — TELEMEDICINE (OUTPATIENT)
Dept: PHARMACY | Facility: HOSPITAL | Age: 54
End: 2024-01-16
Payer: COMMERCIAL

## 2024-01-16 DIAGNOSIS — E11.9 TYPE 2 DIABETES MELLITUS NOT AT GOAL (MULTI): ICD-10-CM

## 2024-01-16 NOTE — PROGRESS NOTES
"Subjective   Patient ID: Jai Betancourt is a 53 y.o. male who presents for Follow-up (DMII). Stopping mounjaro, patient already discussed with PCP. Is continuing to take Jardiance and Metformin. Has been increasing his exercise and eating better. Would like to continue to follow PCP for DMII and this writer involvement will be for if mounjaro started again in future.       Assessment/Plan   Problem List Items Addressed This Visit    None  Visit Diagnoses       Type 2 diabetes mellitus not at goal (CMS/[facility])                LAB RESULTS:  Lab Results   Component Value Date    HGBA1C 7.2 (A) 10/25/2023    HGBA1C 6.4 07/12/2023    HGBA1C 6.8 (A) 04/12/2023     Lab Results   Component Value Date    CREATININE 1.01 04/18/2023      Lab Results   Component Value Date    CHOL 120 04/18/2023    CHOL 122 10/17/2022    CHOL 178 10/20/2021     Lab Results   Component Value Date    HDL 34.0 (A) 04/18/2023    HDL 31.7 (A) 10/17/2022    HDL 35.0 (A) 10/20/2021       No results found for: \"LDLCALC\"  Lab Results   Component Value Date    TRIG 290 (H) 04/18/2023    TRIG 298 (H) 10/17/2022    TRIG 636 (H) 10/20/2021     No components found for: \"CHOLHDL\"          No results found for: \"[id number]\"    Continue all meds under the continuation of care with the referring provider and clinical pharmacy team.    Follow-up as needed.     "

## 2024-01-18 ASSESSMENT — ENCOUNTER SYMPTOMS
BLACKOUTS: 0
NERVOUS/ANXIOUS: 1

## 2024-01-25 ENCOUNTER — OFFICE VISIT (OUTPATIENT)
Dept: PRIMARY CARE | Facility: CLINIC | Age: 54
End: 2024-01-25
Payer: COMMERCIAL

## 2024-01-25 VITALS
TEMPERATURE: 97.3 F | BODY MASS INDEX: 38.8 KG/M2 | SYSTOLIC BLOOD PRESSURE: 133 MMHG | DIASTOLIC BLOOD PRESSURE: 86 MMHG | HEIGHT: 68 IN | HEART RATE: 74 BPM | WEIGHT: 256 LBS | OXYGEN SATURATION: 95 %

## 2024-01-25 DIAGNOSIS — M10.00 IDIOPATHIC GOUT, UNSPECIFIED CHRONICITY, UNSPECIFIED SITE: ICD-10-CM

## 2024-01-25 DIAGNOSIS — Z00.00 HEALTHCARE MAINTENANCE: ICD-10-CM

## 2024-01-25 DIAGNOSIS — I10 BENIGN ESSENTIAL HYPERTENSION: ICD-10-CM

## 2024-01-25 DIAGNOSIS — E11.3293 MILD NONPROLIFERATIVE DIABETIC RETINOPATHY OF BOTH EYES WITHOUT MACULAR EDEMA ASSOCIATED WITH TYPE 2 DIABETES MELLITUS (MULTI): ICD-10-CM

## 2024-01-25 DIAGNOSIS — F41.1 GENERALIZED ANXIETY DISORDER: ICD-10-CM

## 2024-01-25 DIAGNOSIS — E11.9 CONTROLLED TYPE 2 DIABETES MELLITUS WITHOUT COMPLICATION, WITHOUT LONG-TERM CURRENT USE OF INSULIN (MULTI): Primary | ICD-10-CM

## 2024-01-25 DIAGNOSIS — F33.1 MODERATE EPISODE OF RECURRENT MAJOR DEPRESSIVE DISORDER (MULTI): ICD-10-CM

## 2024-01-25 LAB — POC HEMOGLOBIN A1C: 6.8 % (ref 4.2–6.5)

## 2024-01-25 PROCEDURE — 83036 HEMOGLOBIN GLYCOSYLATED A1C: CPT | Performed by: STUDENT IN AN ORGANIZED HEALTH CARE EDUCATION/TRAINING PROGRAM

## 2024-01-25 PROCEDURE — 3008F BODY MASS INDEX DOCD: CPT | Performed by: STUDENT IN AN ORGANIZED HEALTH CARE EDUCATION/TRAINING PROGRAM

## 2024-01-25 PROCEDURE — 99214 OFFICE O/P EST MOD 30 MIN: CPT | Performed by: STUDENT IN AN ORGANIZED HEALTH CARE EDUCATION/TRAINING PROGRAM

## 2024-01-25 PROCEDURE — RXMED WILLOW AMBULATORY MEDICATION CHARGE

## 2024-01-25 PROCEDURE — 3075F SYST BP GE 130 - 139MM HG: CPT | Performed by: STUDENT IN AN ORGANIZED HEALTH CARE EDUCATION/TRAINING PROGRAM

## 2024-01-25 PROCEDURE — 4010F ACE/ARB THERAPY RXD/TAKEN: CPT | Performed by: STUDENT IN AN ORGANIZED HEALTH CARE EDUCATION/TRAINING PROGRAM

## 2024-01-25 PROCEDURE — 3079F DIAST BP 80-89 MM HG: CPT | Performed by: STUDENT IN AN ORGANIZED HEALTH CARE EDUCATION/TRAINING PROGRAM

## 2024-01-25 PROCEDURE — 1036F TOBACCO NON-USER: CPT | Performed by: STUDENT IN AN ORGANIZED HEALTH CARE EDUCATION/TRAINING PROGRAM

## 2024-01-25 RX ORDER — LOSARTAN POTASSIUM 50 MG/1
50 TABLET ORAL 2 TIMES DAILY
Qty: 180 TABLET | Refills: 3 | Status: SHIPPED | OUTPATIENT
Start: 2024-01-25 | End: 2025-01-24

## 2024-01-25 RX ORDER — BUPROPION HYDROCHLORIDE 150 MG/1
150 TABLET ORAL EVERY MORNING
Qty: 30 TABLET | Refills: 5 | Status: SHIPPED | OUTPATIENT
Start: 2024-01-25 | End: 2024-02-12 | Stop reason: SDUPTHER

## 2024-01-25 RX ORDER — ALLOPURINOL 100 MG/1
100 TABLET ORAL 2 TIMES DAILY
Qty: 180 TABLET | Refills: 3 | Status: SHIPPED | OUTPATIENT
Start: 2024-01-25 | End: 2025-01-24

## 2024-01-25 ASSESSMENT — ENCOUNTER SYMPTOMS
NERVOUS/ANXIOUS: 1
BLACKOUTS: 0

## 2024-01-25 NOTE — PATIENT INSTRUCTIONS
Your blood work is up to date; no need for additional draw at this appointment    I have added or changed your medications today. I also refilled your chronic medications. See the medication section of this packet for full details.      I recommend uptitration of the Wellbutrin 300mg in 2-4 weeks    Follow up with your therapist.

## 2024-01-25 NOTE — PROGRESS NOTES
Subjective   Patient ID: Jai Betancourt is a 53 y.o. male who presents for No chief complaint on file..    Re: Anxiety/Depression - mild/marked worsening due to external factors--his daughter is now back at school away from home and his parents' health is ailing. He is already on 20mg of Lexapro and in with a therapist. He is inquiring about switching to a different SSRI vs. Adding a booster such as Wellbutrin.     Re: DM2 - A1c improving. Unable to tolerate the Mounjaro. Patient denies symptomatic highs or lows with regards to their glucose. Reports no polyuria, polydipsia, fatigue, vision changes, and stocking/glove neuropathy. Reports fair foot hygiene, denies knowledge of any foot ulcers or trauma.     Re: Gout - needs refill on allopurinol.       Diabetes  He has type 2 diabetes mellitus. No MedicAlert identification noted. The initial diagnosis of diabetes was made 14 years ago. Hypoglycemia symptoms include nervousness/anxiousness. Pertinent negatives for hypoglycemia complications include no blackouts, no hospitalization, no nocturnal hypoglycemia, no required assistance and no required glucagon injection. Symptoms are improving. Pertinent negatives for diabetic complications include no CVA, heart disease, impotence, nephropathy, peripheral neuropathy, PVD or retinopathy. Risk factors for coronary artery disease include obesity. Current diabetic treatment includes diet and oral agent (dual therapy). He is compliant with treatment most of the time. His weight is stable. He is following a diabetic diet. Meal planning includes avoidance of concentrated sweets. He has not had a previous visit with a dietitian. He participates in exercise three times a week. He monitors blood glucose at home 3-4 x per week. He monitors urine at home <1 x per month. Blood glucose monitoring compliance is fair. His home blood glucose trend is decreasing steadily. His breakfast blood glucose is taken between 8-9 am. His breakfast  "blood glucose range is generally 110-130 mg/dl. His dinner blood glucose is taken after 8 pm. His dinner blood glucose range is generally 140-180 mg/dl. His overall blood glucose range is 140-180 mg/dl. He does not see a podiatrist.Eye exam is current.        Review of Systems   Genitourinary:  Negative for impotence.   Psychiatric/Behavioral:  The patient is nervous/anxious.        Objective   /86   Pulse 74   Temp 36.3 °C (97.3 °F)   Ht 1.727 m (5' 8\")   Wt 116 kg (256 lb)   SpO2 95%   BMI 38.92 kg/m²     Physical Exam  Gen: NAD. AAO x3.  HEENT: NC/AT. Anicteric sclera, symmetric pupils. MMM no thrush.  Neck: Soft, supple. No LAD. No goiter.  CV: RRR nl s1s2 no m/r/g  Pulm: CTAB no w/r/r, good air exchange  GI: soft NTND BS+ no hsm  Ext: WWP no edema  Neuro: II-XII grossly intact, nonfocal systemic findings  MSK: 5/5 strength b/l UE and LE  Gait: unremarkable   Feet: no ulcers, nl monofilament testing       Lab Results   Component Value Date    WBC 6.4 04/18/2023    HGB 17.4 04/18/2023    HCT 53.1 (H) 04/18/2023     04/18/2023    CHOL 120 04/18/2023    TRIG 290 (H) 04/18/2023    HDL 34.0 (A) 04/18/2023    ALT 50 04/18/2023    AST 39 04/18/2023     04/18/2023    K 4.5 04/18/2023     04/18/2023    CREATININE 1.01 04/18/2023    BUN 22 04/18/2023    CO2 29 04/18/2023    HGBA1C 6.8 (A) 01/25/2024     par         Assessment/Plan   # Depression and/or Anxiety  - continue Lexapro 20mg  - add Wellbutrin 150mg, uptitrate to 300mg in 2-4 weeks  - continue therapy    # DM2: Stable, A1c at/near goal  - continue current meds  - Routine Diabetic labs  - counselled on wt loss, diet, exercise, and lifestyle modifications  - yearly foot exams, eye exams    # Gout: chronic, stable  - continue current meds  - PRN colchicine or steroids during a flare up     # HTN: at/near goal  - continue current regimen  - routine lab work if not recent  - continue lifestyle modifications    # Health Maintenance  - " routine blood work  - Colon Cancer Screening: UTD  - PSA: UTD  - Immunizations: UTD  - AAA screening:  not indicated         Problem List Items Addressed This Visit             ICD-10-CM    Benign essential hypertension I10    Relevant Medications    losartan (Cozaar) 50 mg tablet    Controlled type 2 diabetes mellitus without complication, without long-term current use of insulin (CMS/HCC) - Primary E11.9    Generalized anxiety disorder F41.1    Relevant Medications    buPROPion XL (Wellbutrin XL) 150 mg 24 hr tablet    Idiopathic gout M10.00    Relevant Medications    allopurinol (Zyloprim) 100 mg tablet    Mild nonproliferative diabetic retinopathy of both eyes without macular edema (CMS/HCC) E11.3293     Other Visit Diagnoses         Codes    Healthcare maintenance     Z00.00    Relevant Orders    POCT glycosylated hemoglobin (Hb A1C) manually resulted (Completed)    Moderate episode of recurrent major depressive disorder (CMS/HCC)     F33.1    Relevant Medications    buPROPion XL (Wellbutrin XL) 150 mg 24 hr tablet

## 2024-01-26 ENCOUNTER — PHARMACY VISIT (OUTPATIENT)
Dept: PHARMACY | Facility: CLINIC | Age: 54
End: 2024-01-26
Payer: COMMERCIAL

## 2024-02-09 ENCOUNTER — LAB REQUISITION (OUTPATIENT)
Dept: LAB | Facility: HOSPITAL | Age: 54
End: 2024-02-09
Payer: COMMERCIAL

## 2024-02-09 ENCOUNTER — OFFICE VISIT (OUTPATIENT)
Dept: GASTROENTEROLOGY | Facility: EXTERNAL LOCATION | Age: 54
End: 2024-02-09
Payer: COMMERCIAL

## 2024-02-09 DIAGNOSIS — D12.5 BENIGN NEOPLASM OF SIGMOID COLON: ICD-10-CM

## 2024-02-09 DIAGNOSIS — Z86.010 PERSONAL HISTORY OF COLONIC POLYPS: ICD-10-CM

## 2024-02-09 DIAGNOSIS — I10 ESSENTIAL HYPERTENSION, BENIGN: ICD-10-CM

## 2024-02-09 DIAGNOSIS — Z12.11 SCREEN FOR COLON CANCER: Primary | ICD-10-CM

## 2024-02-09 DIAGNOSIS — D12.4 BENIGN NEOPLASM OF DESCENDING COLON: ICD-10-CM

## 2024-02-09 DIAGNOSIS — D12.6 BENIGN NEOPLASM OF COLON, UNSPECIFIED: ICD-10-CM

## 2024-02-09 DIAGNOSIS — G47.30 SLEEP APNEA, UNSPECIFIED TYPE: ICD-10-CM

## 2024-02-09 DIAGNOSIS — E11.9 TYPE 2 DIABETES MELLITUS WITHOUT COMPLICATION, WITHOUT LONG-TERM CURRENT USE OF INSULIN (MULTI): ICD-10-CM

## 2024-02-09 DIAGNOSIS — K57.30 DIVERTICULOSIS OF LARGE INTESTINE WITHOUT DIVERTICULITIS: ICD-10-CM

## 2024-02-09 PROCEDURE — 88305 TISSUE EXAM BY PATHOLOGIST: CPT | Performed by: PATHOLOGY

## 2024-02-09 PROCEDURE — 3008F BODY MASS INDEX DOCD: CPT | Performed by: INTERNAL MEDICINE

## 2024-02-09 PROCEDURE — 88305 TISSUE EXAM BY PATHOLOGIST: CPT

## 2024-02-09 PROCEDURE — 45385 COLONOSCOPY W/LESION REMOVAL: CPT | Performed by: INTERNAL MEDICINE

## 2024-02-09 PROCEDURE — 4010F ACE/ARB THERAPY RXD/TAKEN: CPT | Performed by: INTERNAL MEDICINE

## 2024-02-09 PROCEDURE — 1036F TOBACCO NON-USER: CPT | Performed by: INTERNAL MEDICINE

## 2024-02-12 DIAGNOSIS — F41.1 GENERALIZED ANXIETY DISORDER: ICD-10-CM

## 2024-02-12 DIAGNOSIS — F33.1 MODERATE EPISODE OF RECURRENT MAJOR DEPRESSIVE DISORDER (MULTI): ICD-10-CM

## 2024-02-12 RX ORDER — BUPROPION HYDROCHLORIDE 150 MG/1
150 TABLET ORAL EVERY MORNING
Qty: 90 TABLET | Refills: 1 | Status: SHIPPED | OUTPATIENT
Start: 2024-02-12 | End: 2024-08-10

## 2024-02-15 LAB
LABORATORY COMMENT REPORT: NORMAL
PATH REPORT.COMMENTS IMP SPEC: NORMAL
PATH REPORT.FINAL DX SPEC: NORMAL
PATH REPORT.GROSS SPEC: NORMAL
PATH REPORT.TOTAL CANCER: NORMAL

## 2024-02-15 PROCEDURE — RXMED WILLOW AMBULATORY MEDICATION CHARGE

## 2024-02-16 ENCOUNTER — PHARMACY VISIT (OUTPATIENT)
Dept: PHARMACY | Facility: CLINIC | Age: 54
End: 2024-02-16
Payer: COMMERCIAL

## 2024-03-15 ENCOUNTER — OFFICE VISIT (OUTPATIENT)
Dept: BEHAVIORAL HEALTH | Facility: CLINIC | Age: 54
End: 2024-03-15
Payer: COMMERCIAL

## 2024-03-15 ENCOUNTER — APPOINTMENT (OUTPATIENT)
Dept: OPHTHALMOLOGY | Facility: CLINIC | Age: 54
End: 2024-03-15
Payer: COMMERCIAL

## 2024-03-15 VITALS
HEIGHT: 68 IN | HEART RATE: 77 BPM | BODY MASS INDEX: 39.1 KG/M2 | DIASTOLIC BLOOD PRESSURE: 84 MMHG | WEIGHT: 258 LBS | SYSTOLIC BLOOD PRESSURE: 143 MMHG

## 2024-03-15 DIAGNOSIS — F41.0 PANIC DISORDER: ICD-10-CM

## 2024-03-15 DIAGNOSIS — F41.1 GENERALIZED ANXIETY DISORDER: ICD-10-CM

## 2024-03-15 PROCEDURE — 3008F BODY MASS INDEX DOCD: CPT | Performed by: PSYCHIATRY & NEUROLOGY

## 2024-03-15 PROCEDURE — 99205 OFFICE O/P NEW HI 60 MIN: CPT | Performed by: PSYCHIATRY & NEUROLOGY

## 2024-03-15 PROCEDURE — RXMED WILLOW AMBULATORY MEDICATION CHARGE

## 2024-03-15 PROCEDURE — 3079F DIAST BP 80-89 MM HG: CPT | Performed by: PSYCHIATRY & NEUROLOGY

## 2024-03-15 PROCEDURE — 3077F SYST BP >= 140 MM HG: CPT | Performed by: PSYCHIATRY & NEUROLOGY

## 2024-03-15 PROCEDURE — 1036F TOBACCO NON-USER: CPT | Performed by: PSYCHIATRY & NEUROLOGY

## 2024-03-15 PROCEDURE — 4010F ACE/ARB THERAPY RXD/TAKEN: CPT | Performed by: PSYCHIATRY & NEUROLOGY

## 2024-03-15 RX ORDER — ALPRAZOLAM 0.5 MG/1
0.5 TABLET ORAL DAILY PRN
Qty: 10 TABLET | Refills: 1 | Status: SHIPPED | OUTPATIENT
Start: 2024-03-15 | End: 2024-04-07

## 2024-03-15 NOTE — PROGRESS NOTES
"History Of Present Illness  Jai Betancourt is a 54 y.o. male presenting with history of anxiety, panic symptoms, anger management issues and periodic episodes of depression.  Patient reports being treated for anger issues and mood instability since 2009 with Lexapro currently 20 mg daily.  He has been seeing his primary care physician for this treatment.  In the past year the patient has had several episodes of both anxiety and depression as well as panic symptoms including \"Jell-O legs\" not sleeping, shortness of breath, feeling dizzy and fearful.  These episodes seem to revolve around issues of family transitions.  For example parents are elderly and lives in the [place] area.  1 episode involved father falling and bruising his hip and his dementia getting worse and mother being unable to really help care for him because of her own back problems.  Patient hired elderly care the first time and they did not really help had to call 911 to break down the door at 1 point.  He has had to go to [place] 5 times in the last 6 months.  Finally the father is getting 24 7 care and the patient feels good about this.  He is gone down from calling them every day to calling them 2 days a week.  Father is doing much better now parents are now the only trigger he also had similar episodes when daughter drove to [facility] in order to start culinary training.  Patient reports when these episodes happen it it knocks him out of commission for several days because he does not sleep, does not eat, his brain does not work right, he has anxiety and Jell-O legs.  When his daughter left for school in January the patient got into a anxiety state and in addition to her depression.  He was not getting out of bed.  He was started on Wellbutrin at 1 point during 1 of these episodes and actually made him much worse.  It did not help the anxiety and actually made his mood much more depressed.  When he stopped the Wellbutrin he felt immediately " "better within about 12 hours.     Past Medical History  He has a past medical history of Personal history of other diseases of the musculoskeletal system and connective tissue and Personal history of other endocrine, nutritional and metabolic disease.    Past Psychiatric History:   Previous therapy: no  Previous psychiatric treatment and medication trials: yes - Lexapro started for anger and irritability in 2009.  Wellbutrin this past year made him more depressed.  Previous psychiatric hospitalizations: no  Previous diagnoses: no  Previous suicide attempts: no  History of violence: no  Currently in treatment with he currently has a therapist who really likes and is helping him..  Depression screening was performed with standardized tool: Yes - No Depression    Surgical History  He has a past surgical history that includes Other surgical history (06/29/2021).     Social History  He reports that he has never smoked. He has never used smokeless tobacco. No history on file for alcohol use and drug use.  Patient reports using marijuana he has a medical marijuana card for pain complaints.  Has 2 daughters.  .  Works for a [employer] company in [employer].  His brother is financial power of  for the parents but does not seem to chip and when crises occur with the parents.  Father has dementia but is very happy with the 24-hour care provider that he has.  Mother is a proud woman who never complains until things are really bad.  The mother is functioning extremely well after a miraculous back surgery and she is back to driving, playing cards, living her own life.  Patient prides himself on protecting his family, admits \"I like to be in control\"     Allergies  Patient has no known allergies.  Though Wellbutrin made him significantly depressed.        Psychiatric ROS - Adult  Anxiety: Panic AttackPanic Attack Behaviors: choking/swallowing difficulty, dizzy, shortness of breath, sweaty/clammy, and legs feeling like " "Jell-OSMIN  Depression: negative  Delirium: negative  Psychosis: negative  Leigh: negative  Safety Issues: none  Psychiatric ROS Comment: Depression seems to occur only when he is in the midst of a panic anxiety situation          Mental Status Exam  General: In person visit.  Appearance: Appears stated age.  Good grooming.  Attitude: Cooperative pleasant  Behavior: Good historian good eye contact  Motor Activity: No tremors or tics in the no tardive dyskinesia  Speech: Fluent logical goal-directed  Mood: Euthymic  Affect: Full range appropriate  Thought Process: No formal thought disorder  Thought Content: No delusions no suicidal or homicidal thinking  Thought Perception: No auditory or visual hallucinations  Cognition: Impairment  Insight: Fairly good  Judgement: Good    Psychiatric Risk Assessment  Violence Risk Assessment: other history of anger and yelling up until about 2007.  No physical  Acute Risk of Harm to Others is Considered: low   Suicide Risk Assessment: , chronic pain, male, and severe anxiety  Protective Factors against Suicide: adherence to  treatment, child-related concerns/living with children at home < 18 yrs, employment, fear of suicide, marriage/partnership, positive family relationships, sense of responsibility toward family, social support/connectedness, strong coping skills, and strong therapeutic alliance with provider  Acute Risk of Harm to Self is Considered: low    Last Recorded Vitals  Blood pressure 143/84, pulse 77, height 1.727 m (5' 8\"), weight 117 kg (258 lb).    Relevant Results             Assessment/Plan   Problem List Items Addressed This Visit             ICD-10-CM    Generalized anxiety disorder F41.1    Panic disorder F41.0     Patient has anxiety episodes with physical symptoms including insomnia \"wobbly legs, \"in subsequent days of problems with other symptoms including depression and GI symptoms.  Given his primary fear of being unable to protect his family and " also needing to be in control it would be best if patient had something that he can take as needed during clear trigger situations.  This would be most likely accomplished using a medicine like Xanax 0.5 mg as needed once a day.  He should continue on his Lexapro 20 mg daily as this seems to be a long-term treatment to help with anxiety irritability and anger outbursts.  #10 Xanax tablets given and 1 refill.  Follow-up in 6 months         Relevant Medications    ALPRAZolam (Xanax) 0.5 mg tablet              I spent 65 minutes in the professional and overall care of this patient.      Medication Consent  Medication Consent: risks, benefits, side effects reviewed for all ordered meds    Chuck Freed MD

## 2024-03-15 NOTE — ASSESSMENT & PLAN NOTE
"Patient has anxiety episodes with physical symptoms including insomnia \"wobbly legs, \"in subsequent days of problems with other symptoms including depression and GI symptoms.  Given his primary fear of being unable to protect his family and also needing to be in control it would be best if patient had something that he can take as needed during clear trigger situations.  This would be most likely accomplished using a medicine like Xanax 0.5 mg as needed once a day.  He should continue on his Lexapro 20 mg daily as this seems to be a long-term treatment to help with anxiety irritability and anger outbursts.  #10 Xanax tablets given and 1 refill.  Follow-up in 6 months  "

## 2024-03-18 ENCOUNTER — PHARMACY VISIT (OUTPATIENT)
Dept: PHARMACY | Facility: CLINIC | Age: 54
End: 2024-03-18
Payer: COMMERCIAL

## 2024-04-02 DIAGNOSIS — Z00.00 HEALTHCARE MAINTENANCE: ICD-10-CM

## 2024-04-02 PROCEDURE — RXMED WILLOW AMBULATORY MEDICATION CHARGE

## 2024-04-03 ENCOUNTER — PHARMACY VISIT (OUTPATIENT)
Dept: PHARMACY | Facility: CLINIC | Age: 54
End: 2024-04-03
Payer: COMMERCIAL

## 2024-04-03 PROCEDURE — RXMED WILLOW AMBULATORY MEDICATION CHARGE

## 2024-04-04 ENCOUNTER — PHARMACY VISIT (OUTPATIENT)
Dept: PHARMACY | Facility: CLINIC | Age: 54
End: 2024-04-04
Payer: COMMERCIAL

## 2024-04-18 PROCEDURE — RXMED WILLOW AMBULATORY MEDICATION CHARGE

## 2024-04-19 PROCEDURE — RXMED WILLOW AMBULATORY MEDICATION CHARGE

## 2024-04-22 ENCOUNTER — PHARMACY VISIT (OUTPATIENT)
Dept: PHARMACY | Facility: CLINIC | Age: 54
End: 2024-04-22
Payer: COMMERCIAL

## 2024-04-23 ENCOUNTER — PHARMACY VISIT (OUTPATIENT)
Dept: PHARMACY | Facility: CLINIC | Age: 54
End: 2024-04-23
Payer: COMMERCIAL

## 2024-04-23 ASSESSMENT — ENCOUNTER SYMPTOMS
POLYPHAGIA: 0
FATIGUE: 0
HEADACHES: 0
NERVOUS/ANXIOUS: 0
POLYDIPSIA: 0
CONFUSION: 0
SEIZURES: 0
SWEATS: 0
TREMORS: 0
BLURRED VISION: 0
VISUAL CHANGE: 0
SPEECH DIFFICULTY: 0
HUNGER: 0
BLACKOUTS: 0
WEIGHT LOSS: 0
WEAKNESS: 0
DIZZINESS: 0

## 2024-04-25 ENCOUNTER — OFFICE VISIT (OUTPATIENT)
Dept: PRIMARY CARE | Facility: CLINIC | Age: 54
End: 2024-04-25
Payer: COMMERCIAL

## 2024-04-25 VITALS
SYSTOLIC BLOOD PRESSURE: 128 MMHG | WEIGHT: 260 LBS | DIASTOLIC BLOOD PRESSURE: 85 MMHG | HEART RATE: 71 BPM | BODY MASS INDEX: 39.53 KG/M2 | OXYGEN SATURATION: 96 %

## 2024-04-25 DIAGNOSIS — I10 BENIGN ESSENTIAL HYPERTENSION: ICD-10-CM

## 2024-04-25 DIAGNOSIS — E78.2 HYPERLIPEMIA, MIXED: ICD-10-CM

## 2024-04-25 DIAGNOSIS — F41.1 GENERALIZED ANXIETY DISORDER: ICD-10-CM

## 2024-04-25 DIAGNOSIS — E66.01 CLASS 2 SEVERE OBESITY DUE TO EXCESS CALORIES WITH SERIOUS COMORBIDITY AND BODY MASS INDEX (BMI) OF 38.0 TO 38.9 IN ADULT (MULTI): ICD-10-CM

## 2024-04-25 DIAGNOSIS — E11.9 CONTROLLED TYPE 2 DIABETES MELLITUS WITHOUT COMPLICATION, WITHOUT LONG-TERM CURRENT USE OF INSULIN (MULTI): Primary | ICD-10-CM

## 2024-04-25 LAB — POC HEMOGLOBIN A1C: 7.9 % (ref 4.2–6.5)

## 2024-04-25 PROCEDURE — 1036F TOBACCO NON-USER: CPT | Performed by: STUDENT IN AN ORGANIZED HEALTH CARE EDUCATION/TRAINING PROGRAM

## 2024-04-25 PROCEDURE — 3008F BODY MASS INDEX DOCD: CPT | Performed by: STUDENT IN AN ORGANIZED HEALTH CARE EDUCATION/TRAINING PROGRAM

## 2024-04-25 PROCEDURE — 99214 OFFICE O/P EST MOD 30 MIN: CPT | Performed by: STUDENT IN AN ORGANIZED HEALTH CARE EDUCATION/TRAINING PROGRAM

## 2024-04-25 PROCEDURE — 3074F SYST BP LT 130 MM HG: CPT | Performed by: STUDENT IN AN ORGANIZED HEALTH CARE EDUCATION/TRAINING PROGRAM

## 2024-04-25 PROCEDURE — 83036 HEMOGLOBIN GLYCOSYLATED A1C: CPT | Performed by: STUDENT IN AN ORGANIZED HEALTH CARE EDUCATION/TRAINING PROGRAM

## 2024-04-25 PROCEDURE — 4010F ACE/ARB THERAPY RXD/TAKEN: CPT | Performed by: STUDENT IN AN ORGANIZED HEALTH CARE EDUCATION/TRAINING PROGRAM

## 2024-04-25 PROCEDURE — 3079F DIAST BP 80-89 MM HG: CPT | Performed by: STUDENT IN AN ORGANIZED HEALTH CARE EDUCATION/TRAINING PROGRAM

## 2024-04-25 ASSESSMENT — PAIN SCALES - GENERAL: PAINLEVEL: 0-NO PAIN

## 2024-04-25 NOTE — PROGRESS NOTES
Subjective   Patient ID: Jai Betancourt is a 54 y.o. male who presents for Follow-up.    HPI     Re: DM2 - A1c increased again to 7.9% Declines med changes as last change did not agree with him. Patient denies symptomatic highs or lows with regards to their glucose. Reports no polyuria, polydipsia, fatigue, vision changes, and stocking/glove neuropathy. Reports fair foot hygiene, denies knowledge of any foot ulcers or trauma.     Re: Anx/Dep - tried Wellbutrin at last appointment. Did not help, he stopped this. Still on Lexapro 20mg. Doing better now. On Xanax PRN, hasn't used it however. In with therapy. Back to work.     PMHx, FHx, Social Hx, Surg Hx personally reviewed at this appointment. No pertinent findings and/or changes from prior (if applicable).    ROS: Denies wt gain/loss f/c HA LoC CP SOB NVDC. See HPI above, and scanned sheet (if applicable). All other systems are reviewed and are without complaint.     Review of Systems    Objective   /85   Pulse 71   Wt 118 kg (260 lb)   SpO2 96%   BMI 39.53 kg/m²     Physical Exam  Gen: NAD. AAO x3.  HEENT: NC/AT. Anicteric sclera, symmetric pupils. MMM no thrush.  Neck: Soft, supple. No LAD. No goiter.  CV: RRR nl s1s2 no m/r/g  Pulm: CTAB no w/r/r, good air exchange  GI: soft NTND BS+ no hsm  Ext: WWP no edema  Neuro: II-XII grossly intact, nonfocal systemic findings  MSK: 5/5 strength b/l UE and LE  Gait: unremarkable   Feet: no ulcers, nl monofilament testing     Lab Results   Component Value Date    WBC 6.4 04/18/2023    HGB 17.4 04/18/2023    HCT 53.1 (H) 04/18/2023     04/18/2023    CHOL 120 04/18/2023    TRIG 290 (H) 04/18/2023    HDL 34.0 (A) 04/18/2023    ALT 50 04/18/2023    AST 39 04/18/2023     04/18/2023    K 4.5 04/18/2023     04/18/2023    CREATININE 1.01 04/18/2023    BUN 22 04/18/2023    CO2 29 04/18/2023    HGBA1C 6.8 (A) 01/25/2024     par    Electrocardiogram 12 Lead  Normal sinus rhythm  Normal ECG  No previous  ECGs available  Confirmed by Chris Shaver ([id number]) on 4/21/2023 8:28:36 AM     Assessment/Plan   # Depression and/or Anxiety  - continue Lexapro 20mg  - has PRN Xanax from psychiatrist  - continue therapy     # DM2: worsening control  - continue current meds; lifestyle changes  - Routine Diabetic labs  - counselled on wt loss, diet, exercise, and lifestyle modifications  - yearly foot exams, eye exams     # Gout: chronic, stable  - continue current meds  - PRN colchicine or steroids during a flare up      # HTN: at/near goal  - continue current regimen  - routine lab work if not recent  - continue lifestyle modifications     # Health Maintenance  - routine blood work  - Colon Cancer Screening: UTD  - PSA: current 4/23  - Immunizations: UTD  - AAA screening:  not indicated             Electrodesiccation And Curettage Text: The wound bed was treated with electrodesiccation and curettage after the biopsy was performed.

## 2024-07-01 DIAGNOSIS — E78.2 HYPERLIPEMIA, MIXED: ICD-10-CM

## 2024-07-01 DIAGNOSIS — F41.1 GENERALIZED ANXIETY DISORDER: ICD-10-CM

## 2024-07-01 PROCEDURE — RXMED WILLOW AMBULATORY MEDICATION CHARGE

## 2024-07-01 RX ORDER — FENOFIBRATE 145 MG/1
145 TABLET, FILM COATED ORAL DAILY
Qty: 90 TABLET | Refills: 3 | Status: SHIPPED | OUTPATIENT
Start: 2024-07-01 | End: 2025-07-01

## 2024-07-01 RX ORDER — ESCITALOPRAM OXALATE 20 MG/1
20 TABLET ORAL
Qty: 90 TABLET | Refills: 3 | Status: SHIPPED | OUTPATIENT
Start: 2024-07-01 | End: 2025-07-01

## 2024-07-01 RX ORDER — ATORVASTATIN CALCIUM 40 MG/1
40 TABLET, FILM COATED ORAL
Qty: 90 TABLET | Refills: 3 | Status: SHIPPED | OUTPATIENT
Start: 2024-07-01 | End: 2025-07-01

## 2024-07-03 ENCOUNTER — PHARMACY VISIT (OUTPATIENT)
Dept: PHARMACY | Facility: CLINIC | Age: 54
End: 2024-07-03
Payer: COMMERCIAL

## 2024-07-17 PROCEDURE — RXMED WILLOW AMBULATORY MEDICATION CHARGE

## 2024-07-18 ENCOUNTER — APPOINTMENT (OUTPATIENT)
Dept: OPHTHALMOLOGY | Facility: CLINIC | Age: 54
End: 2024-07-18
Payer: COMMERCIAL

## 2024-07-18 ENCOUNTER — PHARMACY VISIT (OUTPATIENT)
Dept: PHARMACY | Facility: CLINIC | Age: 54
End: 2024-07-18
Payer: COMMERCIAL

## 2024-07-18 PROCEDURE — RXMED WILLOW AMBULATORY MEDICATION CHARGE

## 2024-07-19 ENCOUNTER — PHARMACY VISIT (OUTPATIENT)
Dept: PHARMACY | Facility: CLINIC | Age: 54
End: 2024-07-19
Payer: COMMERCIAL

## 2024-07-25 ENCOUNTER — LAB (OUTPATIENT)
Dept: LAB | Facility: LAB | Age: 54
End: 2024-07-25
Payer: COMMERCIAL

## 2024-07-25 ENCOUNTER — APPOINTMENT (OUTPATIENT)
Dept: PRIMARY CARE | Facility: CLINIC | Age: 54
End: 2024-07-25
Payer: COMMERCIAL

## 2024-07-25 VITALS
BODY MASS INDEX: 38.65 KG/M2 | HEIGHT: 68 IN | DIASTOLIC BLOOD PRESSURE: 85 MMHG | SYSTOLIC BLOOD PRESSURE: 126 MMHG | WEIGHT: 255 LBS | OXYGEN SATURATION: 95 % | TEMPERATURE: 97.4 F | HEART RATE: 76 BPM

## 2024-07-25 DIAGNOSIS — F41.1 GENERALIZED ANXIETY DISORDER: ICD-10-CM

## 2024-07-25 DIAGNOSIS — E11.9 CONTROLLED TYPE 2 DIABETES MELLITUS WITHOUT COMPLICATION, WITHOUT LONG-TERM CURRENT USE OF INSULIN (MULTI): ICD-10-CM

## 2024-07-25 DIAGNOSIS — Z12.5 ENCOUNTER FOR SCREENING FOR MALIGNANT NEOPLASM OF PROSTATE: ICD-10-CM

## 2024-07-25 DIAGNOSIS — E66.01 CLASS 2 SEVERE OBESITY DUE TO EXCESS CALORIES WITH SERIOUS COMORBIDITY AND BODY MASS INDEX (BMI) OF 38.0 TO 38.9 IN ADULT (MULTI): ICD-10-CM

## 2024-07-25 DIAGNOSIS — I10 BENIGN ESSENTIAL HYPERTENSION: ICD-10-CM

## 2024-07-25 DIAGNOSIS — Z00.00 HEALTHCARE MAINTENANCE: Primary | ICD-10-CM

## 2024-07-25 DIAGNOSIS — E78.2 HYPERLIPEMIA, MIXED: ICD-10-CM

## 2024-07-25 DIAGNOSIS — Z00.00 HEALTHCARE MAINTENANCE: ICD-10-CM

## 2024-07-25 PROBLEM — S16.1XXA NECK STRAIN, INITIAL ENCOUNTER: Status: RESOLVED | Noted: 2023-04-12 | Resolved: 2024-07-25

## 2024-07-25 PROBLEM — B35.4 TINEA CORPORIS: Status: RESOLVED | Noted: 2023-04-12 | Resolved: 2024-07-25

## 2024-07-25 LAB
ALBUMIN SERPL BCP-MCNC: 4.7 G/DL (ref 3.4–5)
ALP SERPL-CCNC: 57 U/L (ref 33–120)
ALT SERPL W P-5'-P-CCNC: 48 U/L (ref 10–52)
ANION GAP SERPL CALC-SCNC: 15 MMOL/L (ref 10–20)
AST SERPL W P-5'-P-CCNC: 38 U/L (ref 9–39)
BASOPHILS # BLD AUTO: 0.03 X10*3/UL (ref 0–0.1)
BASOPHILS NFR BLD AUTO: 0.5 %
BILIRUB SERPL-MCNC: 0.4 MG/DL (ref 0–1.2)
BUN SERPL-MCNC: 21 MG/DL (ref 6–23)
CALCIUM SERPL-MCNC: 9.6 MG/DL (ref 8.6–10.6)
CHLORIDE SERPL-SCNC: 99 MMOL/L (ref 98–107)
CHOLEST SERPL-MCNC: 131 MG/DL (ref 0–199)
CHOLESTEROL/HDL RATIO: 4.1
CO2 SERPL-SCNC: 29 MMOL/L (ref 21–32)
CREAT SERPL-MCNC: 1.13 MG/DL (ref 0.5–1.3)
CREAT UR-MCNC: 71.1 MG/DL (ref 20–370)
EGFRCR SERPLBLD CKD-EPI 2021: 77 ML/MIN/1.73M*2
EOSINOPHIL # BLD AUTO: 0.06 X10*3/UL (ref 0–0.7)
EOSINOPHIL NFR BLD AUTO: 1 %
ERYTHROCYTE [DISTWIDTH] IN BLOOD BY AUTOMATED COUNT: 12.2 % (ref 11.5–14.5)
GLUCOSE SERPL-MCNC: 174 MG/DL (ref 74–99)
HCT VFR BLD AUTO: 49.9 % (ref 41–52)
HCV AB SER QL: NONREACTIVE
HDLC SERPL-MCNC: 31.9 MG/DL
HGB BLD-MCNC: 16.7 G/DL (ref 13.5–17.5)
HIV 1+2 AB+HIV1 P24 AG SERPL QL IA: NONREACTIVE
IMM GRANULOCYTES # BLD AUTO: 0.03 X10*3/UL (ref 0–0.7)
IMM GRANULOCYTES NFR BLD AUTO: 0.5 % (ref 0–0.9)
LDLC SERPL CALC-MCNC: ABNORMAL MG/DL
LYMPHOCYTES # BLD AUTO: 1.74 X10*3/UL (ref 1.2–4.8)
LYMPHOCYTES NFR BLD AUTO: 29.5 %
MCH RBC QN AUTO: 28.2 PG (ref 26–34)
MCHC RBC AUTO-ENTMCNC: 33.5 G/DL (ref 32–36)
MCV RBC AUTO: 84 FL (ref 80–100)
MICROALBUMIN UR-MCNC: 14.9 MG/L
MICROALBUMIN/CREAT UR: 21 UG/MG CREAT
MONOCYTES # BLD AUTO: 0.39 X10*3/UL (ref 0.1–1)
MONOCYTES NFR BLD AUTO: 6.6 %
NEUTROPHILS # BLD AUTO: 3.64 X10*3/UL (ref 1.2–7.7)
NEUTROPHILS NFR BLD AUTO: 61.9 %
NON HDL CHOLESTEROL: 99 MG/DL (ref 0–149)
NRBC BLD-RTO: 0 /100 WBCS (ref 0–0)
PLATELET # BLD AUTO: 211 X10*3/UL (ref 150–450)
POC HEMOGLOBIN A1C: 7.6 % (ref 4.2–6.5)
POTASSIUM SERPL-SCNC: 4.4 MMOL/L (ref 3.5–5.3)
PROT SERPL-MCNC: 7.6 G/DL (ref 6.4–8.2)
PSA SERPL-MCNC: 1.18 NG/ML
RBC # BLD AUTO: 5.92 X10*6/UL (ref 4.5–5.9)
RBC #/AREA URNS AUTO: NORMAL /HPF
SODIUM SERPL-SCNC: 139 MMOL/L (ref 136–145)
TRIGL SERPL-MCNC: 525 MG/DL (ref 0–149)
VLDL: ABNORMAL
WBC # BLD AUTO: 5.9 X10*3/UL (ref 4.4–11.3)
WBC #/AREA URNS AUTO: NORMAL /HPF

## 2024-07-25 PROCEDURE — 99396 PREV VISIT EST AGE 40-64: CPT | Performed by: STUDENT IN AN ORGANIZED HEALTH CARE EDUCATION/TRAINING PROGRAM

## 2024-07-25 PROCEDURE — 82570 ASSAY OF URINE CREATININE: CPT

## 2024-07-25 PROCEDURE — 81001 URINALYSIS AUTO W/SCOPE: CPT

## 2024-07-25 PROCEDURE — 80053 COMPREHEN METABOLIC PANEL: CPT

## 2024-07-25 PROCEDURE — 36415 COLL VENOUS BLD VENIPUNCTURE: CPT

## 2024-07-25 PROCEDURE — 3074F SYST BP LT 130 MM HG: CPT | Performed by: STUDENT IN AN ORGANIZED HEALTH CARE EDUCATION/TRAINING PROGRAM

## 2024-07-25 PROCEDURE — 3079F DIAST BP 80-89 MM HG: CPT | Performed by: STUDENT IN AN ORGANIZED HEALTH CARE EDUCATION/TRAINING PROGRAM

## 2024-07-25 PROCEDURE — 86803 HEPATITIS C AB TEST: CPT

## 2024-07-25 PROCEDURE — 83036 HEMOGLOBIN GLYCOSYLATED A1C: CPT | Performed by: STUDENT IN AN ORGANIZED HEALTH CARE EDUCATION/TRAINING PROGRAM

## 2024-07-25 PROCEDURE — 84153 ASSAY OF PSA TOTAL: CPT

## 2024-07-25 PROCEDURE — 85025 COMPLETE CBC W/AUTO DIFF WBC: CPT

## 2024-07-25 PROCEDURE — 82043 UR ALBUMIN QUANTITATIVE: CPT

## 2024-07-25 PROCEDURE — 80061 LIPID PANEL: CPT

## 2024-07-25 PROCEDURE — 4010F ACE/ARB THERAPY RXD/TAKEN: CPT | Performed by: STUDENT IN AN ORGANIZED HEALTH CARE EDUCATION/TRAINING PROGRAM

## 2024-07-25 PROCEDURE — 3008F BODY MASS INDEX DOCD: CPT | Performed by: STUDENT IN AN ORGANIZED HEALTH CARE EDUCATION/TRAINING PROGRAM

## 2024-07-25 PROCEDURE — 1036F TOBACCO NON-USER: CPT | Performed by: STUDENT IN AN ORGANIZED HEALTH CARE EDUCATION/TRAINING PROGRAM

## 2024-07-25 PROCEDURE — 87389 HIV-1 AG W/HIV-1&-2 AB AG IA: CPT

## 2024-07-25 ASSESSMENT — PAIN SCALES - GENERAL: PAINLEVEL: 0-NO PAIN

## 2024-07-25 NOTE — PROGRESS NOTES
"Subjective   Patient ID: Jai Betancourt is a 54 y.o. male who presents for No chief complaint on file..    HPI   Doing well since last in. Recent vacation to [place], went to the [place].     Re: DM2 - A1c of 7.6% He has been making dietary changes since last in. Tried GLP-1 and did poorly with it (side effects) Patient denies symptomatic highs or lows with regards to their glucose. Reports no polyuria, polydipsia, fatigue, vision changes, and stocking/glove neuropathy. Reports fair foot hygiene, denies knowledge of any foot ulcers or trauma.      Re: Anx/Dep - Stable. A few flare ups over the last year. No longer on the Wellbutrin, remains on Lexapro 20mg however. On Xanax PRN, takes 0.5 tabs when necessary which is very rare In with therapy. Back to work.     Re: HM - CRS current, repeat 2029. PSA due. Shots UTD.     PMHx, FHx, Social Hx, Surg Hx personally reviewed at this appointment. No pertinent findings and/or changes from prior (if applicable).     ROS: Denies wt gain/loss f/c HA LoC CP SOB NVDC. See HPI above, and scanned sheet (if applicable). All other systems are reviewed and are without complaint.       Review of Systems    Objective   /85   Pulse 76   Temp 36.3 °C (97.4 °F)   Ht 1.727 m (5' 8\")   Wt 116 kg (255 lb)   SpO2 95%   BMI 38.77 kg/m²     Physical Exam  Gen: NAD. AAO x3.  HEENT: NC/AT. Anicteric sclera, symmetric pupils. MMM no thrush.  Neck: Soft, supple. No LAD. No goiter.  CV: RRR nl s1s2 no m/r/g  Pulm: CTAB no w/r/r, good air exchange  GI: soft NTND BS+ no hsm  Ext: WWP no edema  Neuro: II-XII grossly intact, nonfocal systemic findings  MSK: 5/5 strength b/l UE and LE  Gait: unremarkable   Feet: no ulcers, nl monofilament testing     Lab Results   Component Value Date    WBC 6.4 04/18/2023    HGB 17.4 04/18/2023    HCT 53.1 (H) 04/18/2023     04/18/2023    CHOL 120 04/18/2023    TRIG 290 (H) 04/18/2023    HDL 34.0 (A) 04/18/2023    ALT 50 04/18/2023    AST 39 " 04/18/2023     04/18/2023    K 4.5 04/18/2023     04/18/2023    CREATININE 1.01 04/18/2023    BUN 22 04/18/2023    CO2 29 04/18/2023    HGBA1C 7.6 (A) 07/25/2024     par    Electrocardiogram 12 Lead  Normal sinus rhythm  Normal ECG  No previous ECGs available  Confirmed by Chris Shaver ([id number]) on 4/21/2023 8:28:36 AM      Current Outpatient Medications   Medication Instructions    allopurinol (ZYLOPRIM) 100 mg, oral, 2 times daily    ALPRAZolam (XANAX) 0.5 mg, oral, Daily PRN    aspirin 81 mg, oral, Daily RT    atorvastatin (Lipitor) 40 mg tablet TAKE 1 TABLET (40 MG) BY MOUTH ONCE DAILY.    empagliflozin (Jardiance) 25 mg TAKE 1 TABLET (25 MG) BY MOUTH ONCE DAILY.    escitalopram (Lexapro) 20 mg tablet TAKE 1 TABLET (20 MG) BY MOUTH ONCE DAILY.    fenofibrate (Tricor) 145 mg tablet TAKE 1 TABLET (145 MG) BY MOUTH ONCE DAILY.    losartan (COZAAR) 50 mg, oral, 2 times daily    metFORMIN XR (Glucophage-XR) 500 mg 24 hr tablet TAKE 2 TABLETS BY MOUTH AT IN THE MORNING AND 1 TABLET IN THE EVENING          Assessment/Plan     # Depression and/or Anxiety  - continue Lexapro 20mg  - has PRN Xanax from psychiatrist  - continue therapy     # DM2: improving control, A1c at/near goal  - continue current meds  - Routine Diabetic labs  - counselled on wt loss, diet, exercise, and lifestyle modifications  - yearly foot exams, eye exams      # Gout: chronic, stable  - continue current meds  - PRN colchicine or steroids during a flare up      # HTN: at/near goal  - continue current regimen  - routine lab work if not recent  - continue lifestyle modifications     # Health Maintenance  - routine blood work  - Colon Cancer Screening: UTD, repeat 2029  - PSA: due, ordered today   - Immunizations: UTD  - AAA screening:  not yet indicated       **Addendum for CPAP machine  Re: ANDREA - Patient requesting today new CPAP machine and/or supplies. The patient's current machine is more than 5 years old; however the supplies are  outdated and not in good working order thus need replacing. It is medically necessary to use the CPAP machine because the patient benefits from nightly use of PAP therapy and compliance has been excellent.

## 2024-07-25 NOTE — PATIENT INSTRUCTIONS
Please stop at the lab ([address]) to complete your blood and/or urine work that I've ordered for you.    I will contact you with the results at my soonest convenience. I strongly urge you to use [employer] as this is the quickest and easiest way to access your results and receive my correspondences.    Your medications are up to date today, I will renew them when a refill is due.     Your sugar control is improving. Keep up the great work!    I recommend that you lose weight via lifestyle modifications such as diet and exercise.     See me every 3 months for follow up.

## 2024-09-07 ENCOUNTER — PATIENT MESSAGE (OUTPATIENT)
Dept: PRIMARY CARE | Facility: CLINIC | Age: 54
End: 2024-09-07
Payer: COMMERCIAL

## 2024-09-07 DIAGNOSIS — E11.9 CONTROLLED TYPE 2 DIABETES MELLITUS WITHOUT COMPLICATION, WITHOUT LONG-TERM CURRENT USE OF INSULIN (MULTI): Primary | ICD-10-CM

## 2024-09-09 PROCEDURE — RXMED WILLOW AMBULATORY MEDICATION CHARGE

## 2024-09-09 RX ORDER — BLOOD-GLUCOSE SENSOR
EACH MISCELLANEOUS
Qty: 2 EACH | Refills: 11 | Status: SHIPPED | OUTPATIENT
Start: 2024-09-09

## 2024-09-11 ENCOUNTER — PHARMACY VISIT (OUTPATIENT)
Dept: PHARMACY | Facility: CLINIC | Age: 54
End: 2024-09-11
Payer: COMMERCIAL

## 2024-09-13 ENCOUNTER — APPOINTMENT (OUTPATIENT)
Dept: BEHAVIORAL HEALTH | Facility: CLINIC | Age: 54
End: 2024-09-13
Payer: COMMERCIAL

## 2024-09-13 VITALS
WEIGHT: 257.7 LBS | TEMPERATURE: 96.3 F | BODY MASS INDEX: 39.06 KG/M2 | DIASTOLIC BLOOD PRESSURE: 91 MMHG | SYSTOLIC BLOOD PRESSURE: 147 MMHG | HEIGHT: 68 IN

## 2024-09-13 DIAGNOSIS — F41.1 GENERALIZED ANXIETY DISORDER: ICD-10-CM

## 2024-09-13 PROCEDURE — 3061F NEG MICROALBUMINURIA REV: CPT | Performed by: PSYCHIATRY & NEUROLOGY

## 2024-09-13 PROCEDURE — 3008F BODY MASS INDEX DOCD: CPT | Performed by: PSYCHIATRY & NEUROLOGY

## 2024-09-13 PROCEDURE — 4010F ACE/ARB THERAPY RXD/TAKEN: CPT | Performed by: PSYCHIATRY & NEUROLOGY

## 2024-09-13 PROCEDURE — 99212 OFFICE O/P EST SF 10 MIN: CPT | Performed by: PSYCHIATRY & NEUROLOGY

## 2024-09-13 PROCEDURE — 3080F DIAST BP >= 90 MM HG: CPT | Performed by: PSYCHIATRY & NEUROLOGY

## 2024-09-13 PROCEDURE — 3077F SYST BP >= 140 MM HG: CPT | Performed by: PSYCHIATRY & NEUROLOGY

## 2024-09-13 ASSESSMENT — ENCOUNTER SYMPTOMS: PSYCHIATRIC NEGATIVE: 1

## 2024-09-13 NOTE — PROGRESS NOTES
Subjective   Patient ID: Jia Betancourt is a 54 y.o. male who presents for in person med management visit..  HPI patient reports that the full pill of Xanax makes him too tired and clumsy and even makes him out of it the next morning.  It was suggested he try 1/2 tablet in the future.  He has been having therapy since January which has been helpful.  Anxiety has decreased since the summer.  Parents getting older continues to be the major stressor.  He continues to take Lexapro continues to use medical marijuana.    Review of Systems   Psychiatric/Behavioral: Negative.         Objective   Physical Exam  Psychiatric:         Attention and Perception: Attention and perception normal.         Mood and Affect: Mood and affect normal.         Speech: Speech normal.         Behavior: Behavior normal. Behavior is cooperative.         Thought Content: Thought content normal. Thought content does not include suicidal ideation. Thought content does not include suicidal plan.         Cognition and Memory: Cognition and memory normal.         Judgment: Judgment normal.         Assessment/Plan   Problem List Items Addressed This Visit             ICD-10-CM    Generalized anxiety disorder F41.1     Continue Lexapro 20 mg daily.  In future if needs Xanax for panic anxiety take 1/2 tablet as needed.  Follow-up 3 to 4 months                 Chuck Freed MD 09/13/24 4:45 PM

## 2024-09-13 NOTE — ASSESSMENT & PLAN NOTE
Continue Lexapro 20 mg daily.  In future if needs Xanax for panic anxiety take 1/2 tablet as needed.  Follow-up 3 to 4 months

## 2024-09-16 PROCEDURE — RXMED WILLOW AMBULATORY MEDICATION CHARGE

## 2024-09-18 ENCOUNTER — PHARMACY VISIT (OUTPATIENT)
Dept: PHARMACY | Facility: CLINIC | Age: 54
End: 2024-09-18
Payer: COMMERCIAL

## 2024-09-25 PROCEDURE — RXMED WILLOW AMBULATORY MEDICATION CHARGE

## 2024-09-26 ENCOUNTER — PHARMACY VISIT (OUTPATIENT)
Dept: PHARMACY | Facility: CLINIC | Age: 54
End: 2024-09-26
Payer: COMMERCIAL

## 2024-10-02 PROCEDURE — RXMED WILLOW AMBULATORY MEDICATION CHARGE

## 2024-10-07 ENCOUNTER — PHARMACY VISIT (OUTPATIENT)
Dept: PHARMACY | Facility: CLINIC | Age: 54
End: 2024-10-07
Payer: COMMERCIAL

## 2024-10-12 DIAGNOSIS — E11.9 CONTROLLED TYPE 2 DIABETES MELLITUS WITHOUT COMPLICATION, WITHOUT LONG-TERM CURRENT USE OF INSULIN (MULTI): ICD-10-CM

## 2024-10-14 PROCEDURE — RXMED WILLOW AMBULATORY MEDICATION CHARGE

## 2024-10-14 RX ORDER — METFORMIN HYDROCHLORIDE 500 MG/1
TABLET, EXTENDED RELEASE ORAL
Qty: 270 TABLET | Refills: 2 | Status: SHIPPED | OUTPATIENT
Start: 2024-10-14 | End: 2025-10-14

## 2024-10-15 PROCEDURE — RXMED WILLOW AMBULATORY MEDICATION CHARGE

## 2024-10-16 ENCOUNTER — PHARMACY VISIT (OUTPATIENT)
Dept: PHARMACY | Facility: CLINIC | Age: 54
End: 2024-10-16
Payer: COMMERCIAL

## 2024-10-17 ENCOUNTER — PHARMACY VISIT (OUTPATIENT)
Dept: PHARMACY | Facility: CLINIC | Age: 54
End: 2024-10-17
Payer: COMMERCIAL

## 2024-10-28 ASSESSMENT — ENCOUNTER SYMPTOMS
TREMORS: 1
SEIZURES: 0
DIZZINESS: 0
POLYPHAGIA: 0
CONFUSION: 0
WEAKNESS: 0
HEADACHES: 1
POLYDIPSIA: 0
NERVOUS/ANXIOUS: 0
VISUAL CHANGE: 0
SPEECH DIFFICULTY: 0
HUNGER: 0
BLACKOUTS: 0
FATIGUE: 0
SWEATS: 0
WEIGHT LOSS: 0
BLURRED VISION: 0

## 2024-10-29 ENCOUNTER — APPOINTMENT (OUTPATIENT)
Dept: PRIMARY CARE | Facility: CLINIC | Age: 54
End: 2024-10-29
Payer: COMMERCIAL

## 2024-10-29 VITALS
HEART RATE: 75 BPM | SYSTOLIC BLOOD PRESSURE: 138 MMHG | DIASTOLIC BLOOD PRESSURE: 87 MMHG | WEIGHT: 250 LBS | HEIGHT: 68 IN | OXYGEN SATURATION: 94 % | BODY MASS INDEX: 37.89 KG/M2 | TEMPERATURE: 97.1 F

## 2024-10-29 DIAGNOSIS — E66.812 CLASS 2 SEVERE OBESITY DUE TO EXCESS CALORIES WITH SERIOUS COMORBIDITY AND BODY MASS INDEX (BMI) OF 38.0 TO 38.9 IN ADULT: ICD-10-CM

## 2024-10-29 DIAGNOSIS — E66.01 CLASS 2 SEVERE OBESITY DUE TO EXCESS CALORIES WITH SERIOUS COMORBIDITY AND BODY MASS INDEX (BMI) OF 38.0 TO 38.9 IN ADULT: ICD-10-CM

## 2024-10-29 LAB — POC HEMOGLOBIN A1C: 7 % (ref 4.2–6.5)

## 2024-10-29 PROCEDURE — 3079F DIAST BP 80-89 MM HG: CPT | Performed by: STUDENT IN AN ORGANIZED HEALTH CARE EDUCATION/TRAINING PROGRAM

## 2024-10-29 PROCEDURE — 99213 OFFICE O/P EST LOW 20 MIN: CPT | Performed by: STUDENT IN AN ORGANIZED HEALTH CARE EDUCATION/TRAINING PROGRAM

## 2024-10-29 PROCEDURE — 90471 IMMUNIZATION ADMIN: CPT | Performed by: STUDENT IN AN ORGANIZED HEALTH CARE EDUCATION/TRAINING PROGRAM

## 2024-10-29 PROCEDURE — 3075F SYST BP GE 130 - 139MM HG: CPT | Performed by: STUDENT IN AN ORGANIZED HEALTH CARE EDUCATION/TRAINING PROGRAM

## 2024-10-29 PROCEDURE — 4010F ACE/ARB THERAPY RXD/TAKEN: CPT | Performed by: STUDENT IN AN ORGANIZED HEALTH CARE EDUCATION/TRAINING PROGRAM

## 2024-10-29 PROCEDURE — 3008F BODY MASS INDEX DOCD: CPT | Performed by: STUDENT IN AN ORGANIZED HEALTH CARE EDUCATION/TRAINING PROGRAM

## 2024-10-29 PROCEDURE — 3061F NEG MICROALBUMINURIA REV: CPT | Performed by: STUDENT IN AN ORGANIZED HEALTH CARE EDUCATION/TRAINING PROGRAM

## 2024-10-29 PROCEDURE — 90480 ADMN SARSCOV2 VAC 1/ONLY CMP: CPT | Performed by: STUDENT IN AN ORGANIZED HEALTH CARE EDUCATION/TRAINING PROGRAM

## 2024-10-29 PROCEDURE — 91320 SARSCV2 VAC 30MCG TRS-SUC IM: CPT | Performed by: STUDENT IN AN ORGANIZED HEALTH CARE EDUCATION/TRAINING PROGRAM

## 2024-10-29 PROCEDURE — 83036 HEMOGLOBIN GLYCOSYLATED A1C: CPT | Performed by: STUDENT IN AN ORGANIZED HEALTH CARE EDUCATION/TRAINING PROGRAM

## 2024-10-29 PROCEDURE — 90673 RIV3 VACCINE NO PRESERV IM: CPT | Performed by: STUDENT IN AN ORGANIZED HEALTH CARE EDUCATION/TRAINING PROGRAM

## 2024-10-29 PROCEDURE — 1036F TOBACCO NON-USER: CPT | Performed by: STUDENT IN AN ORGANIZED HEALTH CARE EDUCATION/TRAINING PROGRAM

## 2024-10-29 ASSESSMENT — PAIN SCALES - GENERAL: PAINLEVEL_OUTOF10: 0-NO PAIN

## 2024-10-29 ASSESSMENT — ENCOUNTER SYMPTOMS
FATIGUE: 0
SEIZURES: 0
WEAKNESS: 0
POLYPHAGIA: 0
VISUAL CHANGE: 0
HUNGER: 0
SWEATS: 0
DIZZINESS: 0
WEIGHT LOSS: 0
BLURRED VISION: 0
BLACKOUTS: 0
HEADACHES: 1
TREMORS: 1
SPEECH DIFFICULTY: 0
CONFUSION: 0
POLYDIPSIA: 0
NERVOUS/ANXIOUS: 0

## 2024-11-01 ENCOUNTER — APPOINTMENT (OUTPATIENT)
Dept: OPHTHALMOLOGY | Facility: CLINIC | Age: 54
End: 2024-11-01
Payer: COMMERCIAL

## 2024-11-01 DIAGNOSIS — H25.813 COMBINED FORM OF AGE-RELATED CATARACT, BOTH EYES: ICD-10-CM

## 2024-11-01 DIAGNOSIS — H52.223 REGULAR ASTIGMATISM OF BOTH EYES: Primary | ICD-10-CM

## 2024-11-01 DIAGNOSIS — H52.13 MYOPIA WITH PRESBYOPIA OF BOTH EYES: ICD-10-CM

## 2024-11-01 DIAGNOSIS — E11.3293 MILD NONPROLIFERATIVE DIABETIC RETINOPATHY OF BOTH EYES WITHOUT MACULAR EDEMA ASSOCIATED WITH TYPE 2 DIABETES MELLITUS: ICD-10-CM

## 2024-11-01 DIAGNOSIS — H52.4 MYOPIA WITH PRESBYOPIA OF BOTH EYES: ICD-10-CM

## 2024-11-01 PROCEDURE — 92015 DETERMINE REFRACTIVE STATE: CPT | Performed by: OPTOMETRIST

## 2024-11-01 PROCEDURE — 92002 INTRM OPH EXAM NEW PATIENT: CPT | Performed by: OPTOMETRIST

## 2024-11-01 PROCEDURE — 92310 CONTACT LENS FITTING OU: CPT | Performed by: OPTOMETRIST

## 2024-11-01 ASSESSMENT — REFRACTION_CURRENTRX
OS_SPHERE: -7.50
OD_BASECURVE: 8.7
OD_CYLINDER: -1.75
OD_BRAND: BIOFINITY TORIC
OS_BASECURVE: 8.7
OS_CYLINDER: -0.75
OD_AXIS: 180
OS_AXIS: 140
OS_BRAND: BIOFINITY TORIC
OS_BASECURVE: 8.7
OD_SPHERE: -8.00
OS_SPHERE: -7.00
OS_DIAMETER: 14.5
OD_BRAND: BIOFINITY TORIC
OD_CYLINDER: -1.75
OS_CYLINDER: -1.25
OD_DIAMETER: 14.5
OS_DIAMETER: 14.5
OD_SPHERE: -8.00
OS_BRAND: BIOFINITY TORIC
OS_AXIS: 140
OD_BASECURVE: 8.7
OD_DIAMETER: 14.5
OD_AXIS: 180

## 2024-11-01 ASSESSMENT — REFRACTION_MANIFEST
OD_AXIS: 180
OS_AXIS: 145
OD_SPHERE: -9.00
OS_CYLINDER: -2.50
OS_SPHERE: -9.75
OD_CYLINDER: -1.75
OS_ADD: +1.75
OD_ADD: +1.75

## 2024-11-01 ASSESSMENT — VISUAL ACUITY
OD_CC: 20/20
OS_CC+: -1
METHOD: SNELLEN - LINEAR
VA_OR_OS_CURRENT_RX: 20/20
OS_CC: 20/30
CORRECTION_TYPE: CONTACTS
VA_OR_OD_CURRENT_RX: 2020
VA_OR_OD_CURRENT_RX: 2020
VA_OR_OS_CURRENT_RX: 20/20

## 2024-11-01 ASSESSMENT — EXTERNAL EXAM - RIGHT EYE: OD_EXAM: NORMAL

## 2024-11-01 ASSESSMENT — TONOMETRY
OD_IOP_MMHG: 13
OS_IOP_MMHG: 14
IOP_METHOD: GOLDMANN APPLANATION

## 2024-11-01 ASSESSMENT — SLIT LAMP EXAM - LIDS
COMMENTS: GOOD POSITION
COMMENTS: GOOD POSITION

## 2024-11-01 ASSESSMENT — CONF VISUAL FIELD
OD_SUPERIOR_NASAL_RESTRICTION: 0
OD_INFERIOR_TEMPORAL_RESTRICTION: 0
OD_INFERIOR_NASAL_RESTRICTION: 0
OS_INFERIOR_TEMPORAL_RESTRICTION: 0
OD_NORMAL: 1
OS_SUPERIOR_NASAL_RESTRICTION: 0
OD_SUPERIOR_TEMPORAL_RESTRICTION: 0
OS_INFERIOR_NASAL_RESTRICTION: 0
OS_SUPERIOR_TEMPORAL_RESTRICTION: 0
OS_NORMAL: 1

## 2024-11-01 ASSESSMENT — REFRACTION_WEARINGRX
OD_SPHERE: -8.50
OS_SPHERE: -10.00
OD_CYLINDER: -2.00
OS_CYLINDER: -0.75
OS_AXIS: 145
OD_AXIS: 180
SPECS_TYPE: BIFOCAL

## 2024-11-01 ASSESSMENT — ENCOUNTER SYMPTOMS: EYES NEGATIVE: 1

## 2024-11-01 ASSESSMENT — CUP TO DISC RATIO
OD_RATIO: .25
OS_RATIO: .25

## 2024-11-01 ASSESSMENT — EXTERNAL EXAM - LEFT EYE: OS_EXAM: NORMAL

## 2024-11-04 DIAGNOSIS — E11.9 CONTROLLED TYPE 2 DIABETES MELLITUS WITHOUT COMPLICATION, WITHOUT LONG-TERM CURRENT USE OF INSULIN (MULTI): Primary | ICD-10-CM

## 2024-11-04 PROCEDURE — RXMED WILLOW AMBULATORY MEDICATION CHARGE

## 2024-11-04 RX ORDER — HYDROCHLOROTHIAZIDE 12.5 MG/1
1 CAPSULE ORAL
Qty: 4 EACH | Refills: 6 | Status: SHIPPED | OUTPATIENT
Start: 2024-11-04

## 2024-11-07 ENCOUNTER — PHARMACY VISIT (OUTPATIENT)
Dept: PHARMACY | Facility: CLINIC | Age: 54
End: 2024-11-07
Payer: COMMERCIAL

## 2024-12-04 ENCOUNTER — PATIENT MESSAGE (OUTPATIENT)
Dept: PRIMARY CARE | Facility: CLINIC | Age: 54
End: 2024-12-04
Payer: COMMERCIAL

## 2024-12-04 DIAGNOSIS — R53.82 CHRONIC FATIGUE: Primary | ICD-10-CM

## 2024-12-06 ENCOUNTER — LAB (OUTPATIENT)
Dept: LAB | Facility: LAB | Age: 54
End: 2024-12-06
Payer: COMMERCIAL

## 2024-12-06 DIAGNOSIS — R53.82 CHRONIC FATIGUE: ICD-10-CM

## 2024-12-06 PROCEDURE — 84402 ASSAY OF FREE TESTOSTERONE: CPT

## 2024-12-06 PROCEDURE — 84443 ASSAY THYROID STIM HORMONE: CPT

## 2024-12-06 PROCEDURE — 36415 COLL VENOUS BLD VENIPUNCTURE: CPT

## 2024-12-07 LAB — TSH SERPL-ACNC: 2.61 MIU/L (ref 0.44–3.98)

## 2024-12-13 ENCOUNTER — APPOINTMENT (OUTPATIENT)
Dept: BEHAVIORAL HEALTH | Facility: CLINIC | Age: 54
End: 2024-12-13
Payer: COMMERCIAL

## 2024-12-13 LAB
TESTOSTERONE FREE (CHAN): 63.6 PG/ML (ref 35–155)
TESTOSTERONE,TOTAL,LC-MS/MS: 416 NG/DL (ref 250–1100)

## 2024-12-19 PROCEDURE — RXMED WILLOW AMBULATORY MEDICATION CHARGE

## 2024-12-21 ENCOUNTER — PHARMACY VISIT (OUTPATIENT)
Dept: PHARMACY | Facility: CLINIC | Age: 54
End: 2024-12-21
Payer: COMMERCIAL

## 2024-12-23 DIAGNOSIS — Z00.00 HEALTHCARE MAINTENANCE: ICD-10-CM

## 2024-12-23 PROCEDURE — RXMED WILLOW AMBULATORY MEDICATION CHARGE

## 2024-12-26 ENCOUNTER — PHARMACY VISIT (OUTPATIENT)
Dept: PHARMACY | Facility: CLINIC | Age: 54
End: 2024-12-26
Payer: COMMERCIAL

## 2024-12-26 DIAGNOSIS — E11.9 CONTROLLED TYPE 2 DIABETES MELLITUS WITHOUT COMPLICATION, WITHOUT LONG-TERM CURRENT USE OF INSULIN (MULTI): Primary | ICD-10-CM

## 2024-12-30 PROCEDURE — RXMED WILLOW AMBULATORY MEDICATION CHARGE

## 2025-01-06 ENCOUNTER — PHARMACY VISIT (OUTPATIENT)
Dept: PHARMACY | Facility: CLINIC | Age: 55
End: 2025-01-06
Payer: COMMERCIAL

## 2025-01-07 ENCOUNTER — APPOINTMENT (OUTPATIENT)
Dept: PHARMACY | Facility: HOSPITAL | Age: 55
End: 2025-01-07
Payer: COMMERCIAL

## 2025-01-07 DIAGNOSIS — E11.9 CONTROLLED TYPE 2 DIABETES MELLITUS WITHOUT COMPLICATION, WITHOUT LONG-TERM CURRENT USE OF INSULIN (MULTI): Primary | ICD-10-CM

## 2025-01-07 NOTE — PATIENT INSTRUCTIONS
Thank you for entrusting your care to the Clinical Pharmacy Team! We look forward to seeing you again soon.  Please call your clinical pharmacist with any questions or concerns.    You are enrolled in the [facility] Employee Value Based Insurance Design (VBID) program. This program allows you to receive for $0 co-pays on diabetes medications/supplies.  To maintain your eligibility for this program, you must:  Maintain  employee insurance coverage  Fill prescriptions at a  pharmacy for pick-up or home delivery  Complete a visit with a clinical pharmacist at least once annually    Kelle Candelaria, PharmD  P: [phone]    To schedule an appointment, please contact:  P: [phone]

## 2025-01-07 NOTE — PROGRESS NOTES
[facility] ([facility])    Jai Betancourt is a 54 y.o. male referred to Clinical Pharmacy Team to complete a comprehensive medication review (CMR) with a pharmacist as part of the [employer] (VB[employer]) diabetes program.  Pharmacy team may also provide assistance in diabetes management per discussion with referring provider and/or endocrinology. Referring Provider: Jai Biswas MD    Does patient follow with Endocrinology: No    Subjective   No Known Allergies    [facility]  [address], [address]  [facility] [address]  Phone: [phone] Fax: [phone]    [facility] - [facility] - [facility] AT Portal to [facility] Sites  [facility]  [place] [address]  Phone: [phone] Fax: [phone]    Diabetes  He presents for his follow-up diabetic visit. He has type 2 diabetes mellitus. His disease course has been fluctuating (minimally, borderline controlled). Risk factors for coronary artery disease include diabetes mellitus, obesity, male sex and dyslipidemia. His weight is decreasing steadily. An ACE inhibitor/angiotensin II receptor blocker is being taken.     Pertinent PMH Review:  PMH of Pancreatitis: No  PMH of Retinopathy: Yes  PMH of Urinary Tract Infections: No  PMH of MTC: No    HOME MONITORING  Monitoring Device: CGM, Freestyle Cherry 3  Monitoring Frequency: continuous     Current home BG readings: on average between 120-130 mg/dL, as low as low 70s, highest 220 mg/dL   Any episodes of hypoglycemia? Yes, reports dips into 70s mg/dL, when going too long without eating .  Did patient treat episode of hypoglycemia appropriately? Yes, eats meal    Objective     BP Readings from Last 6 Encounters:   10/29/24 138/87   09/13/24 (!) 147/91   07/25/24 126/85   04/25/24 128/85   03/15/24 143/84   01/25/24 133/86      Daily Weight  10/29/24 : 113 kg (250 lb)  09/13/24 : 117  "kg (257 lb 11.2 oz)  07/25/24 : 116 kg (255 lb)  04/25/24 : 118 kg (260 lb)  03/15/24 : 117 kg (258 lb)  01/25/24 : 116 kg (256 lb)     LAB  Lab Results   Component Value Date    BILITOT 0.4 07/25/2024    CALCIUM 9.6 07/25/2024    CO2 29 07/25/2024    CL 99 07/25/2024    CREATININE 1.13 07/25/2024    GLUCOSE 174 (H) 07/25/2024    ALKPHOS 57 07/25/2024    K 4.4 07/25/2024    PROT 7.6 07/25/2024     07/25/2024    AST 38 07/25/2024    ALT 48 07/25/2024    BUN 21 07/25/2024    ANIONGAP 15 07/25/2024    ALBUMIN 4.7 07/25/2024    GFRMALE 89 04/18/2023     Lab Results   Component Value Date    TRIG 525 (H) 07/25/2024    CHOL 131 07/25/2024    LDLCALC  07/25/2024      Comment:      The calculation of LDL and VLDL are inaccurate when the Triglycerides are greater than 400 mg/dL or when the patient is non-fasting. If LDL measurement is necessary contact the testing laboratory for an alternative LDL assay.                                  Near   Borderline      AGE      Desirable  Optimal    High     High     Very High     0-19 Y     0 - 109     ---    110-129   >/= 130     ----    20-24 Y     0 - 119     ---    120-159   >/= 160     ----      >24 Y     0 -  99   100-129  130-159   160-189     >/=190      HDL 31.9 07/25/2024     Lab Results   Component Value Date    HGBA1C 7.0 (A) 10/29/2024     Estimated body mass index is 38.01 kg/m² as calculated from the following:    Height as of 10/29/24: 1.727 m (5' 8\").    Weight as of 10/29/24: 113 kg (250 lb).     Current Outpatient Medications on File Prior to Visit   Medication Sig Dispense Refill    allopurinol (Zyloprim) 100 mg tablet Take 1 tablet (100 mg) by mouth 2 times a day. 180 tablet 3    ALPRAZolam (Xanax) 0.5 mg tablet Take 1 tablet (0.5 mg) by mouth once daily as needed for sleep or anxiety for up to 20 days. 10 tablet 1    aspirin 81 mg EC tablet Take 1 tablet (81 mg) by mouth once daily.      atorvastatin (Lipitor) 40 mg tablet TAKE 1 TABLET (40 MG) BY MOUTH " ONCE DAILY. 90 tablet 3    blood-glucose sensor (FreeStyle Cherry 3 Plus Sensor) device Apply 1 sensor every 15 days. 4 each 6    blood-glucose sensor device Change sensor every 15 days 2 each 11    empagliflozin (Jardiance) 25 mg TAKE 1 TABLET (25 MG) BY MOUTH ONCE DAILY. 90 tablet 2    escitalopram (Lexapro) 20 mg tablet TAKE 1 TABLET (20 MG) BY MOUTH ONCE DAILY. 90 tablet 3    fenofibrate (Tricor) 145 mg tablet TAKE 1 TABLET (145 MG) BY MOUTH ONCE DAILY. 90 tablet 3    FreeStyle Cherry 3 Sensor device Apply sensor every 14 days 2 each 11    losartan (Cozaar) 50 mg tablet Take 1 tablet (50 mg) by mouth 2 times a day. 180 tablet 3    metFORMIN XR (Glucophage-XR) 500 mg 24 hr tablet TAKE 2 TABLETS BY MOUTH AT IN THE MORNING AND 1 TABLET IN THE EVENING 270 tablet 2     No current facility-administered medications on file prior to visit.      MEDICATION RECONCILIATION  Added: none  Changed:   Metformin - frequency changed, pt reports taking one tab QAM and one tab QPM  Removed: none    Drug Interactions:  None warranting change in therapy at this time    CURRENT DIABETES PHARMACOTHERAPY  Jardiance 25 mg tab - one tab (25 mg) PO daily  Metformin  mg tab - two tab (1000 mg) PO QAM and one tab (500 mg) PO QPM  (total daily dose of 1500 mg) pt reports taking one tab QAM and one tab QPM    PREVENTATIVE PHARMACOTHERAPY  Statin? yes  LDL: at goal (< 70 mg/dL)  ACE-I/ARB? yes  BP: not at goal, < 130/80  UACR: normal (< 30 mg/g)  Aspirin?  yes    Assessment/Plan   Problem List Items Addressed This Visit       Controlled type 2 diabetes mellitus without complication, without long-term current use of insulin (Multi)      Diabetes:  Patient's Type 2 diabetes is borderline controlled with most recent A1c of 7.0% on 10/29/24 (Goal < 7%).   Comorbidity/Complication Regimen: appropriate    Comprehensive Medication Review:  Reviewed all medications on patient medication list in EMR. Discussed indication, administration, MOA and  possible side effects to medications. Answered all patient questions and concerns.   Patient denies side effects with medications.   Adherence is expected to be Good.   Additional concerns with medication list: none    VBID Employee Diabetes Program Enrollment: Renewal  Patient enrolled in  Employee diabetes program for $0 co-pays on diabetes medications/supplies. Renewal should be active in 2-4 weeks.  Requested VBID enrollment date: 12/25/24  PharmD Management Level: Level 1   Pharmacy fill location: [facility]    Follow up: 10-12 months for renewal    Kelle Candelaria PharmD     Continue all meds under the continuation of care with the referring provider and clinical pharmacy team. Patient/Caregiver was informed they may decline to participate or withdraw from participation in pharmacy services at any time.

## 2025-01-10 PROCEDURE — RXMED WILLOW AMBULATORY MEDICATION CHARGE

## 2025-01-11 DIAGNOSIS — M10.00 IDIOPATHIC GOUT, UNSPECIFIED CHRONICITY, UNSPECIFIED SITE: ICD-10-CM

## 2025-01-11 DIAGNOSIS — I10 BENIGN ESSENTIAL HYPERTENSION: ICD-10-CM

## 2025-01-13 RX ORDER — LOSARTAN POTASSIUM 50 MG/1
50 TABLET ORAL 2 TIMES DAILY
Qty: 180 TABLET | Refills: 3 | Status: SHIPPED | OUTPATIENT
Start: 2025-01-13 | End: 2026-01-13

## 2025-01-13 RX ORDER — ALLOPURINOL 100 MG/1
100 TABLET ORAL 2 TIMES DAILY
Qty: 180 TABLET | Refills: 3 | Status: SHIPPED | OUTPATIENT
Start: 2025-01-13 | End: 2026-01-13

## 2025-01-14 PROCEDURE — RXMED WILLOW AMBULATORY MEDICATION CHARGE

## 2025-01-15 DIAGNOSIS — F41.0 PANIC DISORDER: ICD-10-CM

## 2025-01-15 PROCEDURE — RXMED WILLOW AMBULATORY MEDICATION CHARGE

## 2025-01-15 RX ORDER — ALPRAZOLAM 0.5 MG/1
TABLET ORAL
Qty: 10 TABLET | Refills: 0 | Status: SHIPPED | OUTPATIENT
Start: 2025-01-15

## 2025-01-17 ENCOUNTER — PHARMACY VISIT (OUTPATIENT)
Dept: PHARMACY | Facility: CLINIC | Age: 55
End: 2025-01-17
Payer: COMMERCIAL

## 2025-01-27 PROCEDURE — RXMED WILLOW AMBULATORY MEDICATION CHARGE

## 2025-01-29 ENCOUNTER — PHARMACY VISIT (OUTPATIENT)
Dept: PHARMACY | Facility: CLINIC | Age: 55
End: 2025-01-29
Payer: COMMERCIAL

## 2025-01-30 ENCOUNTER — APPOINTMENT (OUTPATIENT)
Dept: PRIMARY CARE | Facility: CLINIC | Age: 55
End: 2025-01-30
Payer: COMMERCIAL

## 2025-01-30 VITALS
SYSTOLIC BLOOD PRESSURE: 134 MMHG | OXYGEN SATURATION: 95 % | BODY MASS INDEX: 38.62 KG/M2 | HEART RATE: 85 BPM | DIASTOLIC BLOOD PRESSURE: 80 MMHG | WEIGHT: 254 LBS

## 2025-01-30 DIAGNOSIS — E11.9 CONTROLLED TYPE 2 DIABETES MELLITUS WITHOUT COMPLICATION, WITHOUT LONG-TERM CURRENT USE OF INSULIN (MULTI): Primary | ICD-10-CM

## 2025-01-30 LAB — POC HEMOGLOBIN A1C: 6.2 % (ref 4.2–6.5)

## 2025-01-30 PROCEDURE — 3075F SYST BP GE 130 - 139MM HG: CPT | Performed by: STUDENT IN AN ORGANIZED HEALTH CARE EDUCATION/TRAINING PROGRAM

## 2025-01-30 PROCEDURE — 3079F DIAST BP 80-89 MM HG: CPT | Performed by: STUDENT IN AN ORGANIZED HEALTH CARE EDUCATION/TRAINING PROGRAM

## 2025-01-30 PROCEDURE — 4010F ACE/ARB THERAPY RXD/TAKEN: CPT | Performed by: STUDENT IN AN ORGANIZED HEALTH CARE EDUCATION/TRAINING PROGRAM

## 2025-01-30 PROCEDURE — 99213 OFFICE O/P EST LOW 20 MIN: CPT | Performed by: STUDENT IN AN ORGANIZED HEALTH CARE EDUCATION/TRAINING PROGRAM

## 2025-01-30 PROCEDURE — 83036 HEMOGLOBIN GLYCOSYLATED A1C: CPT | Performed by: STUDENT IN AN ORGANIZED HEALTH CARE EDUCATION/TRAINING PROGRAM

## 2025-01-30 ASSESSMENT — ENCOUNTER SYMPTOMS
BLURRED VISION: 0
FATIGUE: 0
POLYDIPSIA: 0
HEADACHES: 0
POLYPHAGIA: 0
WEIGHT LOSS: 0
WEAKNESS: 0
VISUAL CHANGE: 0
TREMORS: 0
BLACKOUTS: 0

## 2025-01-30 ASSESSMENT — PAIN SCALES - GENERAL: PAINLEVEL_OUTOF10: 0-NO PAIN

## 2025-01-30 NOTE — PROGRESS NOTES
Subjective   Patient ID: Jai Betancourt is a 54 y.o. male who presents for Follow-up.    Diabetes  He has type 2 diabetes mellitus. No MedicAlert identification noted. The initial diagnosis of diabetes was made 14 years ago. Pertinent negatives for hypoglycemia include no headaches, mood changes or tremors. Pertinent negatives for diabetes include no blurred vision, no chest pain, no fatigue, no foot paresthesias, no foot ulcerations, no polydipsia, no polyphagia, no polyuria, no visual change, no weakness and no weight loss. Hypoglycemia complications include nocturnal hypoglycemia. Pertinent negatives for hypoglycemia complications include no blackouts, no hospitalization, no required assistance and no required glucagon injection. Symptoms are improving. Pertinent negatives for diabetic complications include no CVA, heart disease, impotence, nephropathy, peripheral neuropathy, PVD or retinopathy. Risk factors for coronary artery disease include hypertension and obesity. Current diabetic treatment includes diet and oral agent (dual therapy). He is compliant with treatment all of the time. His weight is decreasing steadily. He is following a diabetic diet. Meal planning includes avoidance of concentrated sweets and carbohydrate counting. He has not had a previous visit with a dietitian. He participates in exercise three times a week. He monitors blood glucose at home 5+ x per day. Blood glucose monitoring compliance is excellent. His home blood glucose trend is decreasing steadily. His breakfast blood glucose is taken between 8-9 am. His breakfast blood glucose range is generally  mg/dl. His lunch blood glucose is taken between 12-1 pm. His lunch blood glucose range is generally  mg/dl. His dinner blood glucose is taken between 6-7 pm. His dinner blood glucose range is generally 110-130 mg/dl. His bedtime blood glucose is taken between 10-11 pm. His bedtime blood glucose range is generally 140-180  mg/dl. His overall blood glucose range is 110-130 mg/dl. He does not see a podiatrist.Eye exam is current.        Review of Systems   Constitutional:  Negative for fatigue and weight loss.   Eyes:  Negative for blurred vision.   Cardiovascular:  Negative for chest pain.   Endocrine: Negative for polydipsia, polyphagia and polyuria.   Genitourinary:  Negative for impotence.   Neurological:  Negative for tremors, weakness and headaches.     Doing well since last in. Recent snorkeling vacation was very successful.     Re: DM2 - A1c of 7.0! Using a CGM (Cherry 3), sugars 140-160.  Patient denies symptomatic highs or lows with regards to their glucose. Reports no polyuria, polydipsia, fatigue, vision changes, and stocking/glove neuropathy. Reports fair foot hygiene, denies knowledge of any foot ulcers or trauma.      Re: Anx/Dep - Stable. A few flare ups over the last year. Continues on the Lexapro 20mg with minimal side effects. On Xanax PRN, takes 0.5 tabs when necessary which is very rare In with therapy. Back to work.      Re: HM - CRS current, repeat 2029. PSA UTD. Flu and COVID due today.      PMHx, FHx, Social Hx, Surg Hx personally reviewed at this appointment. No pertinent findings and/or changes from prior (if applicable).     ROS: Denies wt gain/loss f/c HA LoC CP SOB NVDC. See HPI above, and scanned sheet (if applicable). All other systems are reviewed and are without complaint.     Objective   /80   Pulse 85   Wt 115 kg (254 lb)   SpO2 95%   BMI 38.62 kg/m²     Physical Exam  Gen: NAD. AAO x3.  HEENT: NC/AT. Anicteric sclera, symmetric pupils. MMM no thrush.  Neck: Soft, supple. No LAD. No goiter.  CV: RRR nl s1s2 no m/r/g  Pulm: CTAB no w/r/r, good air exchange  GI: soft NTND BS+ no hsm  Ext: WWP no edema  Neuro: II-XII grossly intact, nonfocal systemic findings  MSK: 5/5 strength b/l UE and LE  Gait: unremarkable   Feet: no ulcers, nl monofilament testing     Lab Results   Component Value Date     WBC 5.9 07/25/2024    HGB 16.7 07/25/2024    HCT 49.9 07/25/2024     07/25/2024    CHOL 131 07/25/2024    TRIG 525 (H) 07/25/2024    HDL 31.9 07/25/2024    ALT 48 07/25/2024    AST 38 07/25/2024     07/25/2024    K 4.4 07/25/2024    CL 99 07/25/2024    CREATININE 1.13 07/25/2024    BUN 21 07/25/2024    CO2 29 07/25/2024    TSH 2.61 12/06/2024    HGBA1C 7.0 (A) 10/29/2024     par    Electrocardiogram 12 Lead  Normal sinus rhythm  Normal ECG  No previous ECGs available  Confirmed by Chris Shaver ([id number]) on 4/21/2023 8:28:36 AM      Current Outpatient Medications   Medication Instructions    allopurinol (ZYLOPRIM) 100 mg, oral, 2 times daily    ALPRAZolam (Xanax) 0.5 mg tablet Take 1/2 tablet daily as need for sleep or anxiety    aspirin 81 mg, oral, Daily RT    atorvastatin (Lipitor) 40 mg tablet TAKE 1 TABLET (40 MG) BY MOUTH ONCE DAILY.    blood-glucose sensor (FreeStyle Cherry 3 Plus Sensor) device Apply 1 sensor every 15 days.    blood-glucose sensor device Change sensor every 15 days    empagliflozin (Jardiance) 25 mg TAKE 1 TABLET (25 MG) BY MOUTH ONCE DAILY.    escitalopram (Lexapro) 20 mg tablet TAKE 1 TABLET (20 MG) BY MOUTH ONCE DAILY.    fenofibrate (Tricor) 145 mg tablet TAKE 1 TABLET (145 MG) BY MOUTH ONCE DAILY.    FreeStyle Cehrry 3 Sensor device Apply sensor every 14 days    losartan (COZAAR) 50 mg, oral, 2 times daily    metFORMIN XR (Glucophage-XR) 500 mg 24 hr tablet TAKE 2 TABLETS BY MOUTH AT IN THE MORNING AND 1 TABLET IN THE EVENING        Assessment/Plan   Doing great since last in.     # DM2: phenomenal A1c control  - continue current meds  - Routine Diabetic labs  - counselled on wt loss, diet, exercise, and lifestyle modifications  - yearly foot exams, eye exams      # Depression and/or Anxiety  - continue Lexapro 20mg  - has PRN Xanax from psychiatrist  - continue therapy     # Gout: chronic, stable  - continue current meds  - PRN colchicine or steroids during a flare up      #  HTN: at/near goal  - continue current regimen  - routine lab work if not recent  - continue lifestyle modifications     # Health Maintenance  - routine blood work  - Colon Cancer Screening: UTD, repeat 2029  - PSA: UTD  - Immunizations: UTD  - AAA screening:  not yet indicated

## 2025-01-30 NOTE — PATIENT INSTRUCTIONS
Your blood work is up to date; no need for additional draw at this appointment    Your medications are up to date today, I will renew them when a refill is due.     Your sugar control is excellent!    See me in 6 months.

## 2025-02-14 ENCOUNTER — APPOINTMENT (OUTPATIENT)
Dept: BEHAVIORAL HEALTH | Facility: CLINIC | Age: 55
End: 2025-02-14
Payer: COMMERCIAL

## 2025-02-18 ENCOUNTER — APPOINTMENT (OUTPATIENT)
Dept: BEHAVIORAL HEALTH | Facility: CLINIC | Age: 55
End: 2025-02-18
Payer: COMMERCIAL

## 2025-02-18 DIAGNOSIS — F41.1 GENERALIZED ANXIETY DISORDER: ICD-10-CM

## 2025-02-18 DIAGNOSIS — F33.41 RECURRENT MAJOR DEPRESSIVE DISORDER, IN PARTIAL REMISSION (CMS-HCC): ICD-10-CM

## 2025-02-18 PROCEDURE — 99215 OFFICE O/P EST HI 40 MIN: CPT

## 2025-02-18 PROCEDURE — 4010F ACE/ARB THERAPY RXD/TAKEN: CPT

## 2025-02-18 NOTE — PROGRESS NOTES
Subjective   Patient ID: Jai Betancourt is a 54 y.o. male who presents for MICH and MDD.     Virtual or Telephone Consent    An interactive audio and video telecommunication system which permits real time communications between the patient (at the originating site) and provider (at the distant site) was utilized to provide this telehealth service.   Verbal consent was requested and obtained from Jai Betancourt on this date, 02/18/25 for a telehealth visit.      HPI  Depression and anxiety started d/t his parents decline in health.  When he is anxious he has racing thoughts, diarrhea, an uneasy feeling, and his legs are unsteady for days. Anxiety is triggered with work related stress or worrying about what if's with his kids and parents.  When he is depressed he lacks an appetite d/t diarrhea, lacks motivation, isolates, and struggles with insomnia.  With the use of escitalopram 20 mg he feels that MICH and MDD are manageable now that he is learning that he must relinquish the thought of having control.  He attends therapy monthly.  Denies thoughts of Self-harm and SI/HI/AVH.    Past medications:  Bupropion 150 mg- declined mood/increase anxiety  Zolpidem 10 mg- effective    Review of Systems   All other systems reviewed and are negative.        Objective   Physical Exam  Psychiatric:         Attention and Perception: Attention normal.         Mood and Affect: Mood normal.         Speech: Speech normal.         Behavior: Behavior is cooperative.         Thought Content: Thought content normal.         Cognition and Memory: Cognition normal.         Judgment: Judgment normal.       Acute risk of self-harm remains low despite current stressors (acute and chronic). No reported thoughts of self-harm plan, or intent. She is future-oriented, feels responsibility for her family, and has no hx of SA or hospitalizations.      Assessment/Plan   Continue escitalopram 20 mg to target mood and anxiety.  Continue alprazolam 0.25  mg to target anxiety.  Utilize the website Psychology Today to find a therapist.   Provided with crisis/emergency resources, including Mobile Crisis [phone], Crisis Text Line (text 5MRBS mm 934260) and the National Suicide Prevention Lifeline hotline 1-627.474.2003. Agrees to call 911 or go to the nearest emergency department if s/he feels unsafe, or has suicidal thinking with a plan or intent.   Advised to call [phone] to report any urgent concerns and/or schedule a sooner follow-up.   Next appointment: 3 months

## 2025-02-26 PROCEDURE — RXMED WILLOW AMBULATORY MEDICATION CHARGE

## 2025-02-28 ENCOUNTER — PHARMACY VISIT (OUTPATIENT)
Dept: PHARMACY | Facility: CLINIC | Age: 55
End: 2025-02-28
Payer: COMMERCIAL

## 2025-03-24 PROCEDURE — RXMED WILLOW AMBULATORY MEDICATION CHARGE

## 2025-03-26 ENCOUNTER — PHARMACY VISIT (OUTPATIENT)
Dept: PHARMACY | Facility: CLINIC | Age: 55
End: 2025-03-26
Payer: COMMERCIAL

## 2025-04-09 PROCEDURE — RXMED WILLOW AMBULATORY MEDICATION CHARGE

## 2025-04-10 ENCOUNTER — PHARMACY VISIT (OUTPATIENT)
Dept: PHARMACY | Facility: CLINIC | Age: 55
End: 2025-04-10
Payer: COMMERCIAL

## 2025-04-10 PROCEDURE — RXMED WILLOW AMBULATORY MEDICATION CHARGE

## 2025-05-05 ENCOUNTER — APPOINTMENT (OUTPATIENT)
Dept: BEHAVIORAL HEALTH | Facility: CLINIC | Age: 55
End: 2025-05-05
Payer: COMMERCIAL

## 2025-05-05 DIAGNOSIS — F41.1 GENERALIZED ANXIETY DISORDER: ICD-10-CM

## 2025-05-05 PROCEDURE — 3044F HG A1C LEVEL LT 7.0%: CPT

## 2025-05-05 PROCEDURE — 4010F ACE/ARB THERAPY RXD/TAKEN: CPT

## 2025-05-05 PROCEDURE — 99213 OFFICE O/P EST LOW 20 MIN: CPT

## 2025-05-05 NOTE — PROGRESS NOTES
Subjective   Patient ID: Jai Betancourt is a 55 y.o. male who presents for MICH and MDD.     Virtual or Telephone Consent    An interactive audio and video telecommunication system which permits real time communications between the patient (at the originating site) and provider (at the distant site) was utilized to provide this telehealth service.   Verbal consent was requested and obtained from Jai Betancourt on this date, 05/05/25 for a telehealth visit and the patient's location was confirmed at the time of the visit.     HPI  Since we last met his mood has been stable and situational anxiety remains manageable.  He remains in therapy monthly and has started marriage counseling monthly to improve communication between the two which the couple has notice positive changes.   Denies thoughts of Self-harm and SI/HI/AVH.     Past medications:  Bupropion 150 mg- declined mood/increase anxiety  Zolpidem 10 mg- effective    Review of Systems   All other systems reviewed and are negative.        Objective   Physical Exam  Psychiatric:         Attention and Perception: Attention normal.         Mood and Affect: Mood normal.         Speech: Speech normal.         Behavior: Behavior is cooperative.         Thought Content: Thought content normal.         Cognition and Memory: Cognition normal.         Judgment: Judgment normal.         Acute risk of self-harm remains low despite current stressors (acute and chronic). No reported thoughts of self-harm plan, or intent. She is future-oriented, feels responsibility for her family, and has no hx of SA or hospitalizations.     Assessment/Plan   Continue escitalopram 20 mg to target mood and anxiety.  Continue alprazolam 0.25 mg to target anxiety.  Utilize the website Psychology Today to find a therapist.   Provided with crisis/emergency resources, including Mobile Crisis [phone], Crisis Text Line (text 3GSMT fw 742992) and the National Suicide Prevention Lifeline hotline  1-159.878.5817. Agrees to call 911 or go to the nearest emergency department if s/he feels unsafe, or has suicidal thinking with a plan or intent.   Advised to call [phone] to report any urgent concerns and/or schedule a sooner follow-up.   Next appointment: 3 months

## 2025-05-07 PROCEDURE — RXMED WILLOW AMBULATORY MEDICATION CHARGE

## 2025-05-09 ENCOUNTER — PHARMACY VISIT (OUTPATIENT)
Dept: PHARMACY | Facility: CLINIC | Age: 55
End: 2025-05-09
Payer: COMMERCIAL

## 2025-06-17 DIAGNOSIS — E78.2 HYPERLIPEMIA, MIXED: ICD-10-CM

## 2025-06-17 DIAGNOSIS — F41.1 GENERALIZED ANXIETY DISORDER: ICD-10-CM

## 2025-06-17 PROCEDURE — RXMED WILLOW AMBULATORY MEDICATION CHARGE

## 2025-06-17 RX ORDER — FENOFIBRATE 145 MG/1
145 TABLET, FILM COATED ORAL DAILY
Qty: 90 TABLET | Refills: 3 | Status: SHIPPED | OUTPATIENT
Start: 2025-06-17 | End: 2026-06-17

## 2025-06-17 RX ORDER — ATORVASTATIN CALCIUM 40 MG/1
40 TABLET, FILM COATED ORAL
Qty: 90 TABLET | Refills: 3 | Status: SHIPPED | OUTPATIENT
Start: 2025-06-17 | End: 2026-06-17

## 2025-06-17 RX ORDER — ESCITALOPRAM OXALATE 20 MG/1
20 TABLET ORAL
Qty: 90 TABLET | Refills: 3 | Status: SHIPPED | OUTPATIENT
Start: 2025-06-17 | End: 2026-06-17

## 2025-06-19 ENCOUNTER — PHARMACY VISIT (OUTPATIENT)
Dept: PHARMACY | Facility: CLINIC | Age: 55
End: 2025-06-19
Payer: COMMERCIAL

## 2025-06-23 ENCOUNTER — TELEPHONE (OUTPATIENT)
Dept: DERMATOLOGY | Facility: CLINIC | Age: 55
End: 2025-06-23
Payer: COMMERCIAL

## 2025-06-24 PROCEDURE — RXMED WILLOW AMBULATORY MEDICATION CHARGE

## 2025-06-25 ENCOUNTER — PHARMACY VISIT (OUTPATIENT)
Dept: PHARMACY | Facility: CLINIC | Age: 55
End: 2025-06-25
Payer: COMMERCIAL

## 2025-06-27 DIAGNOSIS — I10 BENIGN ESSENTIAL HYPERTENSION: Primary | ICD-10-CM

## 2025-07-09 DIAGNOSIS — E11.9 CONTROLLED TYPE 2 DIABETES MELLITUS WITHOUT COMPLICATION, WITHOUT LONG-TERM CURRENT USE OF INSULIN: ICD-10-CM

## 2025-07-09 PROCEDURE — RXMED WILLOW AMBULATORY MEDICATION CHARGE

## 2025-07-09 RX ORDER — METFORMIN HYDROCHLORIDE 500 MG/1
TABLET, EXTENDED RELEASE ORAL
Qty: 270 TABLET | Refills: 2 | Status: SHIPPED | OUTPATIENT
Start: 2025-07-09 | End: 2026-07-09

## 2025-07-11 ENCOUNTER — PHARMACY VISIT (OUTPATIENT)
Dept: PHARMACY | Facility: CLINIC | Age: 55
End: 2025-07-11
Payer: COMMERCIAL

## 2025-07-21 ASSESSMENT — ENCOUNTER SYMPTOMS
SPEECH DIFFICULTY: 0
POLYDIPSIA: 0
BLURRED VISION: 0
CONFUSION: 0
HEADACHES: 1
NERVOUS/ANXIOUS: 0
SEIZURES: 0
BLACKOUTS: 0
WEIGHT LOSS: 0
WEAKNESS: 0
DIZZINESS: 0
HUNGER: 1
VISUAL CHANGE: 0
FATIGUE: 0
TREMORS: 0
POLYPHAGIA: 0
SWEATS: 1

## 2025-07-28 ENCOUNTER — APPOINTMENT (OUTPATIENT)
Dept: PRIMARY CARE | Facility: CLINIC | Age: 55
End: 2025-07-28
Payer: COMMERCIAL

## 2025-07-28 VITALS
TEMPERATURE: 96.5 F | BODY MASS INDEX: 38.8 KG/M2 | DIASTOLIC BLOOD PRESSURE: 85 MMHG | OXYGEN SATURATION: 94 % | SYSTOLIC BLOOD PRESSURE: 130 MMHG | WEIGHT: 256 LBS | HEART RATE: 78 BPM | HEIGHT: 68 IN

## 2025-07-28 DIAGNOSIS — E11.9 CONTROLLED TYPE 2 DIABETES MELLITUS WITHOUT COMPLICATION, WITHOUT LONG-TERM CURRENT USE OF INSULIN: ICD-10-CM

## 2025-07-28 DIAGNOSIS — E78.2 HYPERLIPEMIA, MIXED: ICD-10-CM

## 2025-07-28 DIAGNOSIS — I10 BENIGN ESSENTIAL HYPERTENSION: ICD-10-CM

## 2025-07-28 DIAGNOSIS — Z12.5 ENCOUNTER FOR SCREENING FOR MALIGNANT NEOPLASM OF PROSTATE: ICD-10-CM

## 2025-07-28 DIAGNOSIS — Z00.00 HEALTHCARE MAINTENANCE: Primary | ICD-10-CM

## 2025-07-28 DIAGNOSIS — E66.01 CLASS 2 SEVERE OBESITY DUE TO EXCESS CALORIES WITH SERIOUS COMORBIDITY AND BODY MASS INDEX (BMI) OF 38.0 TO 38.9 IN ADULT: ICD-10-CM

## 2025-07-28 DIAGNOSIS — E66.812 CLASS 2 SEVERE OBESITY DUE TO EXCESS CALORIES WITH SERIOUS COMORBIDITY AND BODY MASS INDEX (BMI) OF 38.0 TO 38.9 IN ADULT: ICD-10-CM

## 2025-07-28 DIAGNOSIS — E11.3293 MILD NONPROLIFERATIVE DIABETIC RETINOPATHY OF BOTH EYES WITHOUT MACULAR EDEMA ASSOCIATED WITH TYPE 2 DIABETES MELLITUS: ICD-10-CM

## 2025-07-28 DIAGNOSIS — F41.1 GENERALIZED ANXIETY DISORDER: ICD-10-CM

## 2025-07-28 LAB — POC HEMOGLOBIN A1C: 6.4 % (ref 4.2–6.5)

## 2025-07-28 PROCEDURE — 3008F BODY MASS INDEX DOCD: CPT | Performed by: STUDENT IN AN ORGANIZED HEALTH CARE EDUCATION/TRAINING PROGRAM

## 2025-07-28 PROCEDURE — 90471 IMMUNIZATION ADMIN: CPT | Performed by: STUDENT IN AN ORGANIZED HEALTH CARE EDUCATION/TRAINING PROGRAM

## 2025-07-28 PROCEDURE — 4010F ACE/ARB THERAPY RXD/TAKEN: CPT | Performed by: STUDENT IN AN ORGANIZED HEALTH CARE EDUCATION/TRAINING PROGRAM

## 2025-07-28 PROCEDURE — 3075F SYST BP GE 130 - 139MM HG: CPT | Performed by: STUDENT IN AN ORGANIZED HEALTH CARE EDUCATION/TRAINING PROGRAM

## 2025-07-28 PROCEDURE — 90715 TDAP VACCINE 7 YRS/> IM: CPT | Performed by: STUDENT IN AN ORGANIZED HEALTH CARE EDUCATION/TRAINING PROGRAM

## 2025-07-28 PROCEDURE — 3079F DIAST BP 80-89 MM HG: CPT | Performed by: STUDENT IN AN ORGANIZED HEALTH CARE EDUCATION/TRAINING PROGRAM

## 2025-07-28 PROCEDURE — 99396 PREV VISIT EST AGE 40-64: CPT | Performed by: STUDENT IN AN ORGANIZED HEALTH CARE EDUCATION/TRAINING PROGRAM

## 2025-07-28 PROCEDURE — 3044F HG A1C LEVEL LT 7.0%: CPT | Performed by: STUDENT IN AN ORGANIZED HEALTH CARE EDUCATION/TRAINING PROGRAM

## 2025-07-28 PROCEDURE — 83036 HEMOGLOBIN GLYCOSYLATED A1C: CPT | Performed by: STUDENT IN AN ORGANIZED HEALTH CARE EDUCATION/TRAINING PROGRAM

## 2025-07-28 ASSESSMENT — PAIN SCALES - GENERAL: PAINLEVEL_OUTOF10: 0-NO PAIN

## 2025-07-28 NOTE — PATIENT INSTRUCTIONS
Please stop at any  lab ([address] if you choose to use my building) to complete your blood and/or urine work that I've ordered for you.    I will contact you with the results at my soonest convenience. I strongly urge you to use [employer] as this is the quickest and easiest way to access your results and receive my correspondences.    Your medications are up to date today, I will renew them when a refill is due.     You received your tetanus shot today!     See me every 6 months.

## 2025-07-28 NOTE — PROGRESS NOTES
"Subjective   Patient ID: Jai Betancourt is a 55 y.o. male who presents for No chief complaint on file..  History of Present Illness  Jai Betancourt is a 55 year old male who presents for a follow-up visit regarding diabetes management.    His diabetes is well-controlled with an A1c of 6.4%, slightly increased from previous levels. He uses a continuous glucose monitor with average readings around 150 mg/dL. Current blood glucose is 152 mg/dL postprandially. His medication regimen includes Jardiance 25 mg and metformin 500 mg twice daily. Mounjaro was discontinued due to severe vomiting.    He has anxiety and depression, managed with therapy and Lexapro. Anxiety flares during travel, but he recently had a positive experience at a concert.    He uses a CPAP machine for sleep apnea and is compliant with its use. He does not smoke or consume alcohol excessively.      PMHx, FHx, Social Hx, Surg Hx personally reviewed at this appointment. No pertinent findings and/or changes from prior (if applicable).    ROS: Unless specified above, pt denies wt gain/loss f/c HA LoC CP SOB NVDC. See HPI above, and scanned sheet (if applicable). All other systems are reviewed and are without complaint.     Objective     /85   Pulse 78   Temp 35.8 °C (96.5 °F)   Ht 1.727 m (5' 8\")   Wt 116 kg (256 lb)   SpO2 94%   BMI 38.92 kg/m²      Physical Exam  Gen: NAD. AAO x3.  HEENT: NC/AT. Anicteric sclera, symmetric pupils. MMM no thrush.  Neck: Soft, supple. No LAD. No goiter.  CV: RRR nl s1s2 no m/r/g  Pulm: CTAB no w/r/r, good air exchange  GI: soft NTND BS+ no hsm  Ext: WWP no edema  Neuro: II-XII grossly intact, nonfocal systemic findings  MSK: 5/5 strength b/l UE and LE  Gait: unremarkable   Feet: no ulcers, nl monofilament testing     Current Outpatient Medications   Medication Instructions    allopurinol (ZYLOPRIM) 100 mg, oral, 2 times daily    ALPRAZolam (Xanax) 0.5 mg tablet Take 1/2 tablet daily as need for sleep or " anxiety    aspirin 81 mg, oral, Daily RT    atorvastatin (Lipitor) 40 mg tablet TAKE 1 TABLET (40 MG) BY MOUTH ONCE DAILY.    blood-glucose sensor (FreeStyle Cherry 3 Plus Sensor) device Apply 1 sensor every 15 days.    blood-glucose sensor device Change sensor every 15 days    empagliflozin (Jardiance) 25 mg tablet TAKE 1 TABLET (25 MG) BY MOUTH ONCE DAILY.    escitalopram (Lexapro) 20 mg tablet TAKE 1 TABLET (20 MG) BY MOUTH ONCE DAILY.    fenofibrate (Tricor) 145 mg tablet TAKE 1 TABLET (145 MG) BY MOUTH ONCE DAILY.    FreeStyle Cherry 3 Sensor device Apply sensor every 14 days    losartan (COZAAR) 50 mg, oral, 2 times daily    metFORMIN XR (Glucophage-XR) 500 mg 24 hr tablet TAKE 2 TABLETS BY MOUTH AT IN THE MORNING AND 1 TABLET IN THE EVENING        Lab Results   Component Value Date    WBC 5.9 07/25/2024    HGB 16.7 07/25/2024    HCT 49.9 07/25/2024     07/25/2024    CHOL 131 07/25/2024    TRIG 525 (H) 07/25/2024    HDL 31.9 07/25/2024    ALT 48 07/25/2024    AST 38 07/25/2024     07/25/2024    K 4.4 07/25/2024    CL 99 07/25/2024    CREATININE 1.13 07/25/2024    BUN 21 07/25/2024    CO2 29 07/25/2024    TSH 2.61 12/06/2024    HGBA1C 6.2 01/30/2025     par     Electrocardiogram 12 Lead  Normal sinus rhythm  Normal ECG  No previous ECGs available  Confirmed by Chris Shaver ([id number]) on 4/21/2023 8:28:36 AM         Assessment & Plan  Type 2 Diabetes Mellitus  Well-controlled with A1c at 6.4%. Continuous glucose monitor shows average glucose around 150 mg/dL. Previous GLP-1 agonist trial discontinued due to adverse effects. Emphasized avoiding harm and maintaining current management.  - Continue Empagliflozin and Metformin.  - Order CBC, CMP, lipid panel, A1c, urinalysis, and urine microalbumin.  - Ensure annual ophthalmology follow-up.    Hypertension  Managed with Losartan.  - Continue Losartan.    Depression and Anxiety  Mood well-controlled with occasional anxiety flares related to travel. Continues  therapy and managing triggers.  - Continue Escitalopram and Alprazolam as needed.  - Continue therapy sessions.    # ANDREA on CPAP  - continue using CPAP     General Health Maintenance  Routine health maintenance up to date. Immunizations for shingles, COVID, and pneumonia are current. Tetanus vaccine is overdue.  - Administer tetanus vaccine today.  - Recommend annual influenza vaccine in the fall.  - Continue routine screenings and follow-ups.          Jai Biswas MD       This medical note was created with the assistance of artificial intelligence (AI) for documentation purposes. The content has been reviewed and confirmed by the healthcare provider for accuracy and completeness. Patient consented to the use of audio recording and use of AI during their visit.

## 2025-07-29 LAB
ALBUMIN SERPL-MCNC: 4.7 G/DL (ref 3.6–5.1)
ALBUMIN/CREAT UR: 11 MG/G CREAT
ALP SERPL-CCNC: 53 U/L (ref 35–144)
ALT SERPL-CCNC: 51 U/L (ref 9–46)
ANION GAP SERPL CALCULATED.4IONS-SCNC: 12 MMOL/L (CALC) (ref 7–17)
AST SERPL-CCNC: 45 U/L (ref 10–35)
BACTERIA #/AREA URNS HPF: NORMAL /HPF
BASOPHILS # BLD AUTO: 27 CELLS/UL (ref 0–200)
BASOPHILS NFR BLD AUTO: 0.4 %
BILIRUB SERPL-MCNC: 0.5 MG/DL (ref 0.2–1.2)
BUN SERPL-MCNC: 23 MG/DL (ref 7–25)
CALCIUM SERPL-MCNC: 9.7 MG/DL (ref 8.6–10.3)
CHLORIDE SERPL-SCNC: 102 MMOL/L (ref 98–110)
CHOLEST SERPL-MCNC: 111 MG/DL
CHOLEST/HDLC SERPL: 3.2 (CALC)
CO2 SERPL-SCNC: 25 MMOL/L (ref 20–32)
CREAT SERPL-MCNC: 1.18 MG/DL (ref 0.7–1.3)
CREAT UR-MCNC: 56 MG/DL (ref 20–320)
EGFRCR SERPLBLD CKD-EPI 2021: 73 ML/MIN/1.73M2
EOSINOPHIL # BLD AUTO: 48 CELLS/UL (ref 15–500)
EOSINOPHIL NFR BLD AUTO: 0.7 %
ERYTHROCYTE [DISTWIDTH] IN BLOOD BY AUTOMATED COUNT: 13.2 % (ref 11–15)
GLUCOSE SERPL-MCNC: 155 MG/DL (ref 65–139)
HCT VFR BLD AUTO: 51.5 % (ref 38.5–50)
HDLC SERPL-MCNC: 35 MG/DL
HGB BLD-MCNC: 17.1 G/DL (ref 13.2–17.1)
HYALINE CASTS #/AREA URNS LPF: NORMAL /LPF
LDLC SERPL CALC-MCNC: 42 MG/DL (CALC)
LYMPHOCYTES # BLD AUTO: 2054 CELLS/UL (ref 850–3900)
LYMPHOCYTES NFR BLD AUTO: 30.2 %
MCH RBC QN AUTO: 29.2 PG (ref 27–33)
MCHC RBC AUTO-ENTMCNC: 33.2 G/DL (ref 32–36)
MCV RBC AUTO: 87.9 FL (ref 80–100)
MICROALBUMIN UR-MCNC: 0.6 MG/DL
MONOCYTES # BLD AUTO: 456 CELLS/UL (ref 200–950)
MONOCYTES NFR BLD AUTO: 6.7 %
NEUTROPHILS # BLD AUTO: 4216 CELLS/UL (ref 1500–7800)
NEUTROPHILS NFR BLD AUTO: 62 %
NONHDLC SERPL-MCNC: 76 MG/DL (CALC)
PLATELET # BLD AUTO: 217 THOUSAND/UL (ref 140–400)
PMV BLD REES-ECKER: 9 FL (ref 7.5–12.5)
POTASSIUM SERPL-SCNC: 4.5 MMOL/L (ref 3.5–5.3)
PROT SERPL-MCNC: 7.5 G/DL (ref 6.1–8.1)
PSA SERPL-MCNC: 1.2 NG/ML
RBC # BLD AUTO: 5.86 MILLION/UL (ref 4.2–5.8)
RBC #/AREA URNS HPF: NORMAL /HPF
SERVICE CMNT-IMP: NORMAL
SODIUM SERPL-SCNC: 139 MMOL/L (ref 135–146)
SQUAMOUS #/AREA URNS HPF: NORMAL /HPF
TRIGL SERPL-MCNC: 400 MG/DL
WBC # BLD AUTO: 6.8 THOUSAND/UL (ref 3.8–10.8)
WBC #/AREA URNS HPF: NORMAL /HPF

## 2025-08-05 PROCEDURE — RXMED WILLOW AMBULATORY MEDICATION CHARGE

## 2025-08-06 ENCOUNTER — PHARMACY VISIT (OUTPATIENT)
Dept: PHARMACY | Facility: CLINIC | Age: 55
End: 2025-08-06
Payer: COMMERCIAL

## 2025-08-18 ENCOUNTER — APPOINTMENT (OUTPATIENT)
Dept: BEHAVIORAL HEALTH | Facility: CLINIC | Age: 55
End: 2025-08-18
Payer: COMMERCIAL

## 2025-08-18 DIAGNOSIS — F41.1 GENERALIZED ANXIETY DISORDER: ICD-10-CM

## 2025-08-18 PROCEDURE — 99212 OFFICE O/P EST SF 10 MIN: CPT

## 2025-08-18 PROCEDURE — 3044F HG A1C LEVEL LT 7.0%: CPT

## 2025-08-18 PROCEDURE — 4010F ACE/ARB THERAPY RXD/TAKEN: CPT

## 2025-08-26 PROCEDURE — RXMED WILLOW AMBULATORY MEDICATION CHARGE

## 2025-08-29 ENCOUNTER — PHARMACY VISIT (OUTPATIENT)
Dept: PHARMACY | Facility: CLINIC | Age: 55
End: 2025-08-29
Payer: COMMERCIAL

## 2025-09-30 ENCOUNTER — APPOINTMENT (OUTPATIENT)
Dept: DERMATOLOGY | Facility: CLINIC | Age: 55
End: 2025-09-30
Payer: COMMERCIAL

## 2025-10-13 ENCOUNTER — APPOINTMENT (OUTPATIENT)
Dept: BEHAVIORAL HEALTH | Facility: CLINIC | Age: 55
End: 2025-10-13
Payer: COMMERCIAL

## 2025-11-07 ENCOUNTER — APPOINTMENT (OUTPATIENT)
Dept: OPHTHALMOLOGY | Age: 55
End: 2025-11-07
Payer: COMMERCIAL

## 2025-11-19 ENCOUNTER — APPOINTMENT (OUTPATIENT)
Dept: BEHAVIORAL HEALTH | Facility: CLINIC | Age: 55
End: 2025-11-19
Payer: COMMERCIAL

## 2026-01-28 ENCOUNTER — APPOINTMENT (OUTPATIENT)
Dept: PRIMARY CARE | Facility: CLINIC | Age: 56
End: 2026-01-28
Payer: COMMERCIAL